# Patient Record
Sex: MALE | Race: WHITE | NOT HISPANIC OR LATINO | Employment: FULL TIME | URBAN - METROPOLITAN AREA
[De-identification: names, ages, dates, MRNs, and addresses within clinical notes are randomized per-mention and may not be internally consistent; named-entity substitution may affect disease eponyms.]

---

## 2018-01-03 ENCOUNTER — OFFICE VISIT (OUTPATIENT)
Dept: MEDICAL GROUP | Facility: PHYSICIAN GROUP | Age: 38
End: 2018-01-03
Payer: OTHER GOVERNMENT

## 2018-01-03 VITALS
SYSTOLIC BLOOD PRESSURE: 142 MMHG | DIASTOLIC BLOOD PRESSURE: 88 MMHG | HEART RATE: 106 BPM | WEIGHT: 222 LBS | RESPIRATION RATE: 12 BRPM | HEIGHT: 70 IN | TEMPERATURE: 98.6 F | OXYGEN SATURATION: 94 % | BODY MASS INDEX: 31.78 KG/M2

## 2018-01-03 DIAGNOSIS — R53.82 CHRONIC FATIGUE: ICD-10-CM

## 2018-01-03 DIAGNOSIS — F90.2 ATTENTION DEFICIT HYPERACTIVITY DISORDER (ADHD), COMBINED TYPE: ICD-10-CM

## 2018-01-03 DIAGNOSIS — E29.1 HYPOGONADISM MALE: ICD-10-CM

## 2018-01-03 DIAGNOSIS — Z76.89 ENCOUNTER TO ESTABLISH CARE: ICD-10-CM

## 2018-01-03 DIAGNOSIS — F51.04 PSYCHOPHYSIOLOGICAL INSOMNIA: ICD-10-CM

## 2018-01-03 DIAGNOSIS — M51.36 BULGING OF LUMBAR INTERVERTEBRAL DISC: ICD-10-CM

## 2018-01-03 PROBLEM — G47.00 INSOMNIA: Status: ACTIVE | Noted: 2018-01-03

## 2018-01-03 PROBLEM — F41.9 ANXIETY: Status: ACTIVE | Noted: 2018-01-03

## 2018-01-03 PROBLEM — F43.10 PTSD (POST-TRAUMATIC STRESS DISORDER): Status: ACTIVE | Noted: 2018-01-03

## 2018-01-03 PROBLEM — R79.89 LOW TESTOSTERONE: Status: ACTIVE | Noted: 2018-01-03

## 2018-01-03 PROCEDURE — 99204 OFFICE O/P NEW MOD 45 MIN: CPT | Performed by: NURSE PRACTITIONER

## 2018-01-03 RX ORDER — NAPROXEN 500 MG/1
500 TABLET ORAL 2 TIMES DAILY WITH MEALS
COMMUNITY

## 2018-01-03 RX ORDER — TRAZODONE HYDROCHLORIDE 50 MG/1
50 TABLET ORAL
Qty: 90 TAB | Refills: 3 | Status: SHIPPED | OUTPATIENT
Start: 2018-01-03 | End: 2018-06-29 | Stop reason: SDUPTHER

## 2018-01-03 RX ORDER — MODAFINIL 200 MG/1
200 TABLET ORAL DAILY
Qty: 30 TAB | Refills: 2 | Status: SHIPPED
Start: 2018-01-03 | End: 2018-03-30 | Stop reason: SDUPTHER

## 2018-01-03 RX ORDER — TESTOSTERONE CYPIONATE 200 MG/ML
100 INJECTION, SOLUTION INTRAMUSCULAR
COMMUNITY
End: 2018-04-02 | Stop reason: SDUPTHER

## 2018-01-03 RX ORDER — MODAFINIL 200 MG/1
200 TABLET ORAL DAILY
COMMUNITY
End: 2018-01-03 | Stop reason: SDUPTHER

## 2018-01-03 RX ORDER — TAMSULOSIN HYDROCHLORIDE 0.4 MG/1
CAPSULE ORAL
COMMUNITY
Start: 2017-12-23 | End: 2018-01-03 | Stop reason: SDUPTHER

## 2018-01-03 RX ORDER — TAMSULOSIN HYDROCHLORIDE 0.4 MG/1
0.4 CAPSULE ORAL DAILY
Qty: 90 CAP | Refills: 3 | Status: SHIPPED | OUTPATIENT
Start: 2018-01-03 | End: 2018-10-02 | Stop reason: SDUPTHER

## 2018-01-03 RX ORDER — ZOLPIDEM TARTRATE 10 MG/1
TABLET ORAL
COMMUNITY
Start: 2017-11-22 | End: 2018-01-03 | Stop reason: SDUPTHER

## 2018-01-03 RX ORDER — TRAZODONE HYDROCHLORIDE 50 MG/1
50 TABLET ORAL NIGHTLY
COMMUNITY
End: 2018-01-03 | Stop reason: SDUPTHER

## 2018-01-03 RX ORDER — ZOLPIDEM TARTRATE 10 MG/1
10 TABLET ORAL NIGHTLY PRN
Qty: 30 TAB | Refills: 2 | Status: SHIPPED
Start: 2018-01-03 | End: 2018-03-30 | Stop reason: SDUPTHER

## 2018-01-03 NOTE — ASSESSMENT & PLAN NOTE
Was on Adderall for many years but decided to switch medications d/t side effects. Has been doing well on Provigil 200 mg daily. Adderall worked better, but made his heart race.

## 2018-01-03 NOTE — ASSESSMENT & PLAN NOTE
Long-standing problem since being in [place] with . Has associated PTSD. Started on Ambien around 2008. Previously on 15 mg, then decreased to 10 mg. Also on trazodone 50 mg, which he does feel helps his sleep and anxiety.   [facility] has episodes of excessive snoring and apneic episodes.   He had been wearing mouthpiece, but then threw away after choking on it.

## 2018-01-03 NOTE — LETTER
[facility]  NICHOLAS Walters  [address] [place] [address]  Fax: [phone]   Authorization for Release/Disclosure of   Protected Health Information   Name: TITO UGALDE : 1980 SSN: [id number]   Address: [address] [place] [address]  [place] [address] Phone:    [phone] (home)    I authorize the entity listed below to release/disclose the PHI below to:   [facility]/NICHOLAS Walters and NICHOLAS Walters   Provider or Entity Name:  Dr Mulligan   Address   [facility], Zip  [address] [facility] [place] Phone:      Fax:     Reason for request: continuity of care   Information to be released:    [  ] LAST COLONOSCOPY,  including any PATH REPORT and follow-up  [  ] LAST FIT/COLOGUARD RESULT [  ] LAST DEXA  [  ] LAST MAMMOGRAM  [  ] LAST PAP  [  ] LAST LABS [  ] RETINA EXAM REPORT  [  ] IMMUNIZATION RECORDS  [XX ] Release all info      [  ] Check here and initial the line next to each item to release ALL health information INCLUDING  _____ Care and treatment for drug and / or alcohol abuse  _____ HIV testing, infection status, or AIDS  _____ Genetic Testing    DATES OF SERVICE OR TIME PERIOD TO BE DISCLOSED: _____________  I understand and acknowledge that:  * This Authorization may be revoked at any time by you in writing, except if your health information has already been used or disclosed.  * Your health information that will be used or disclosed as a result of you signing this authorization could be re-disclosed by the recipient. If this occurs, your re-disclosed health information may no longer be protected by State or Federal laws.  * You may refuse to sign this Authorization. Your refusal will not affect your ability to obtain treatment.  * This Authorization becomes effective upon signing and will  on (date) __________.      If no date is indicated, this Authorization will  one (1) year from the signature date.    Name: Tito Ugalde    Signature:   Date:          1/3/2018       PLEASE FAX REQUESTED RECORDS BACK TO: [phone]

## 2018-01-03 NOTE — ASSESSMENT & PLAN NOTE
Long-standing problem since about 5-6 years ago after use of anabolic steroids. Had ED, decreased libido, and severely low testosterone levels he believes around 160 range. Pituitary gland was underfunctioning. Has been giving testosterone injections for 5-6 years. Gives them on his own. Takes 200 mg of testosterone every 7-10 days, tries to do it every Sunday. There have been times it is too elevated and it needed to be reduced in dosage occasionally. He has hx of anabolic steroid use.   He did attempt to go 18 months without testosterone, but levels did not improve.   July 2017 labs revealed test level low at 108.Thyroid normal. FSH, prolactin, and estradiol low

## 2018-01-03 NOTE — LETTER
January 11, 2018         Patient: Jitendra Ugalde   YOB: 1980   Date of Visit: 1/3/2018           To Whom it May Concern:    Jitendra Ugalde was seen in my clinic on 1/3/2018. He is chronically managed with intramuscular testosterone supplementation. He may continue to donate blood while administering this medication to himself.     If you have any questions or concerns, please don't hesitate to call.        Sincerely,           VERO Walters.  Electronically Signed

## 2018-01-03 NOTE — ASSESSMENT & PLAN NOTE
Chronic problem with lower back pain. No radiating pain into LE, numbness, tingling, or weakness. Intermittently uses Naproxen for pain. No bowel or bladder changes

## 2018-01-04 NOTE — PROGRESS NOTES
Chief Complaint   Patient presents with   • New Patient     establish care, testosterone treatment, insomnia       HPI:    Jitendra Ugalde is a 37 y.o. male here to establish care, previously a patient of Dr. Augusto Orosco  Insomnia  Long-standing problem since being in [place] with . Has associated PTSD. Started on Ambien around 2008. Previously on 15 mg, then decreased to 10 mg. Also on trazodone 50 mg, which he does feel helps his sleep and anxiety.   [facility] because he has episodes of excessive snoring and apneic episodes.   He had been wearing mouthpiece, but then threw away after choking on it.       Attention deficit hyperactivity disorder (ADHD), combined type  Was on Adderall for many years but decided to switch medications d/t side effects. Has been doing well on Provigil 200 mg daily. Adderall worked better, but made his heart race.     Bulging of lumbar intervertebral disc  Chronic problem with lower back pain. No radiating pain into LE, numbness, tingling, or weakness. Intermittently uses Naproxen for pain. No bowel or bladder changes    Hypogonadism male  Long-standing problem since about 5-6 years ago after use of anabolic steroids. Had ED, decreased libido, and severely low testosterone levels he believes around 160 range. Pituitary gland was underfunctioning. Has been giving testosterone injections for 5-6 years. Gives them on his own. Takes 200 mg of testosterone every 7-10 days, tries to do it every Sunday. There have been times it is too elevated and it needed to be reduced in dosage occasionally. He has hx of anabolic steroid use.   He did attempt to go 18 months without testosterone, but levels did not improve.     Current medicines (including changes today)  Current Outpatient Prescriptions   Medication Sig Dispense Refill   • naproxen (NAPROSYN) 500 MG Tab Take 500 mg by mouth 2 times a day, with meals.     • testosterone cypionate  (DEPO-TESTOSTERONE) 200 MG/ML Solution injection 50 mg by Intramuscular route every 7 days.     • modafinil (PROVIGIL) 200 MG Tab Take 1 Tab by mouth every day for 90 days. 30 Tab 2   • tamsulosin (FLOMAX) 0.4 MG capsule Take 1 Cap by mouth every day. 90 Cap 3   • trazodone (DESYREL) 50 MG Tab Take 1 Tab by mouth at bedtime as needed for Sleep. 90 Tab 3   • zolpidem (AMBIEN) 10 MG Tab Take 1 Tab by mouth at bedtime as needed for Sleep for up to 90 days. 30 Tab 2     No current facility-administered medications for this visit.      He  has a past medical history of ADHD; Hypogonadism male; Insomnia; and PTSD (post-traumatic stress disorder).  He  has a past surgical history that includes carpal tunnel release (Bilateral).  Social History   Substance Use Topics   • Smoking status: Former Smoker     Years: 2.00     Types: Cigarettes   • Smokeless tobacco: Never Used      Comment: only smoked off and on for 2 years   • Alcohol use No     Social History     Social History Narrative   • No narrative on file     Family History   Problem Relation Age of Onset   • Hyperlipidemia Mother    • Hypertension Mother    • Stroke Maternal Grandmother    • Diabetes Maternal Grandfather    • Stroke Maternal Grandfather    • Diabetes Paternal Grandmother    • Stroke Paternal Grandmother    • No Known Problems Son      Family Status   Relation Status   • Mother Alive   • Father Other    hx unk   • Maternal Grandmother    • Maternal Grandfather    • Paternal Grandmother    • Paternal Grandfather    • Son Alive     The patient is not eligible for Health Maintenance     ROS  See form completed by patient, reviewed by me and no changes necessary. Scanned into chart.   Pertinent positives:  Gen: +weight gain  HEENT: +wears glasses   Neck: +stiffness  GI: +indigestion  : +frequency, nocturia  Psych: +hx PTSD and anxiety  Endo: +hormone therapy   All other systems reviewed by me and are negative.       "Objective:     Blood pressure 142/88, pulse (!) 106, temperature 37 °C (98.6 °F), resp. rate 12, height 1.778 m (5' 10\"), weight 100.7 kg (222 lb), SpO2 94 %. Body mass index is 31.85 kg/m².  Physical Exam:  Alert, oriented in no acute distress.  Eye contact is good, speech goal directed, affect bright.  HEENT: EOMI, conjunctiva non-injected, sclera non-icteric. No lid edema or eye drainage  Gross hearing intact  Neck: supple with no cervical or supraclavicular lymphadenopathy, palpable thyroid nodules or thyromegaly.  Lungs: unlabored, clear to auscultation bilaterally with good excursion.  CV: regular rate and rhythm, no murmurs, no carotid bruits.   Lower extremities color normal, vascularity normal, no edema, temperature normal  Skin: No rashes or lesions in visible areas  Neuro: CNs grossly intact. Gait steady. Strength all extremities 5/5.         Assessment and Plan:   Assessment/Plan:  1. Encounter to establish care    2. Hypogonadism male  Status unknown with testosterone replacement. Labs July 2017 revealed hypogonadism. Restart at 200 mg once every 14 days. Check labs 1 week into cycle to determine if dose appropriate.   - tamsulosin (FLOMAX) 0.4 MG capsule; Take 1 Cap by mouth every day.  Dispense: 90 Cap; Refill: 3    3. BMI 31.0-31.9,adult  - Patient identified as having weight management issue.  Appropriate orders and counseling given.    4. Psychophysiological insomnia  Stable continue current meds  - trazodone (DESYREL) 50 MG Tab; Take 1 Tab by mouth at bedtime as needed for Sleep.  Dispense: 90 Tab; Refill: 3  - zolpidem (AMBIEN) 10 MG Tab; Take 1 Tab by mouth at bedtime as needed for Sleep for up to 90 days.  Dispense: 30 Tab; Refill: 2    5. Attention deficit hyperactivity disorder (ADHD), combined type  Stable continue current meds. Request records to review diagnosis  - modafinil (PROVIGIL) 200 MG Tab; Take 1 Tab by mouth every day for 90 days.  Dispense: 30 Tab; Refill: 2    6. Bulging of " lumbar intervertebral disc  Stable with conservative management    7. Chronic fatigue  Stable continue current meds  - modafinil (PROVIGIL) 200 MG Tab; Take 1 Tab by mouth every day for 90 days.  Dispense: 30 Tab; Refill: 2       Follow up:  Return in about 6 months (around 7/3/2018).    Educated in proper administration of medication(s) ordered today including safety, possible SE, risks, benefits, rationale and alternatives to therapy.   Supportive care, differential diagnoses, and indications for immediate follow-up discussed with patient.    Pathogenesis of diagnosis discussed including typical length and natural progression.    Instructed to return to clinic or nearest emergency department for any change in condition, further concerns, or worsening of symptoms.  Patient states understanding of the plan of care and discharge instructions.    Please note that this dictation was created using voice recognition software. I have made every reasonable attempt to correct obvious errors, but I expect that there are errors of grammar and possibly content that I did not discover before finalizing the note.    Followup: Return in about 6 months (around 7/3/2018). sooner should new symptoms or problems arise.

## 2018-01-15 ENCOUNTER — TELEPHONE (OUTPATIENT)
Dept: MEDICAL GROUP | Facility: PHYSICIAN GROUP | Age: 38
End: 2018-01-15

## 2018-01-15 DIAGNOSIS — E29.1 HYPOGONADISM MALE: ICD-10-CM

## 2018-01-15 NOTE — TELEPHONE ENCOUNTER
Please inform patient that I have asked around to see if there is anyone who spends more time in male hormone replacement in [place] and overall every endocrinologist here will basically be able to assist him the same. I can refer him if he would like.    VERO Walters.

## 2018-03-07 ENCOUNTER — OFFICE VISIT (OUTPATIENT)
Dept: ENDOCRINOLOGY | Facility: MEDICAL CENTER | Age: 38
End: 2018-03-07
Payer: OTHER GOVERNMENT

## 2018-03-07 VITALS
DIASTOLIC BLOOD PRESSURE: 80 MMHG | SYSTOLIC BLOOD PRESSURE: 115 MMHG | OXYGEN SATURATION: 96 % | HEIGHT: 70 IN | WEIGHT: 222 LBS | HEART RATE: 115 BPM | BODY MASS INDEX: 31.78 KG/M2

## 2018-03-07 DIAGNOSIS — E78.2 MIXED HYPERLIPIDEMIA: ICD-10-CM

## 2018-03-07 DIAGNOSIS — R79.89 LOW SERUM PROLACTIN: ICD-10-CM

## 2018-03-07 DIAGNOSIS — E29.1 HYPOGONADISM MALE: ICD-10-CM

## 2018-03-07 PROCEDURE — 99204 OFFICE O/P NEW MOD 45 MIN: CPT | Performed by: INTERNAL MEDICINE

## 2018-03-07 NOTE — PROGRESS NOTES
New Patient Consult Note  Primary care provider: NICHOLAS Walters    Reason for consult: Hypogonadism    HPI:  Jitendra Ugalde is a 38 y.o. old patient who comes in today for evaluation of hypogonadism. In his early 20s he used testosterone as anabolic supplement for body building. He uses testosterone for several years. He was then off testosterone for more than couple of years and due to persistent low testosterone about 6 years ago he was resumed on testosterone replacement for hypogonadism. He had labs done in June 2017 when his total testosterone was 141, and he was off testosterone for the previous 2 years. On this lab draw he had very low LH, FSH and prolactin. He has never had an MRI of the pituitary gland. He denies frequent headaches. He denies issues with peripheral vision. He has been now back on testosterone for about 9 months. He feels better on testosterone. 9 months ago without testosterone he had loss of libido, loss of strength, loss of muscle mass; all these are now improved. He is currently on intramuscular testosterone 200 mg every other week. He also has high cholesterol, no known coronary artery disease, currently not on statin.    ROS:  Constitutional: No weight loss  Cardiac: No palpitations or racing heart  Resp: No shortness of breath  All other systems were reviewed and were negative.    Past Medical History:  Patient Active Problem List    Diagnosis Date Noted   • Hypogonadism male 01/03/2018   • Insomnia 01/03/2018   • BMI 31.0-31.9,adult 01/03/2018   • PTSD (post-traumatic stress disorder) 01/03/2018   • Anxiety 01/03/2018   • Attention deficit hyperactivity disorder (ADHD), combined type 01/03/2018   • Bulging of lumbar intervertebral disc 01/03/2018       Past Surgical History:  Past Surgical History:   Procedure Laterality Date   • CARPAL TUNNEL RELEASE Bilateral        Allergies:  Patient has no known allergies.    Social History:  Social History     Social History   • Marital  "status: Single     Spouse name: N/A   • Number of children: N/A   • Years of education: N/A     Occupational History   • Not on file.     Social History Main Topics   • Smoking status: Former Smoker     Years: 2.00     Types: Cigarettes   • Smokeless tobacco: Never Used      Comment: only smoked off and on for 2 years   • Alcohol use No   • Drug use: Yes     Types: Marijuana      Comment: reports marijuana use once eveyr few months   • Sexual activity: Yes     Partners: Female     Other Topics Concern   • Not on file     Social History Narrative   • No narrative on file       Family History:  Family History   Problem Relation Age of Onset   • Hyperlipidemia Mother    • Hypertension Mother    • Stroke Maternal Grandmother    • Diabetes Maternal Grandfather    • Stroke Maternal Grandfather    • Diabetes Paternal Grandmother    • Stroke Paternal Grandmother    • No Known Problems Son        Medications:    Current Outpatient Prescriptions:   •  testosterone cypionate (DEPO-TESTOSTERONE) 200 MG/ML Solution injection, 50 mg by Intramuscular route every 7 days., Disp: , Rfl:   •  naproxen (NAPROSYN) 500 MG Tab, Take 500 mg by mouth 2 times a day, with meals., Disp: , Rfl:   •  modafinil (PROVIGIL) 200 MG Tab, Take 1 Tab by mouth every day for 90 days., Disp: 30 Tab, Rfl: 2  •  tamsulosin (FLOMAX) 0.4 MG capsule, Take 1 Cap by mouth every day., Disp: 90 Cap, Rfl: 3  •  trazodone (DESYREL) 50 MG Tab, Take 1 Tab by mouth at bedtime as needed for Sleep., Disp: 90 Tab, Rfl: 3  •  zolpidem (AMBIEN) 10 MG Tab, Take 1 Tab by mouth at bedtime as needed for Sleep for up to 90 days., Disp: 30 Tab, Rfl: 2    Labs:  Labs in July 2017: Creatinine 1.2, hemoglobin 15.1, AST 32, ALT 44, , total testosterone 108, LH 0.1, FSH 0.3, prolactin 0.9, vitamin D 30, free testosterone 7.0, TSH 2.33, free T4 0.83    Physical Examination:  Vital signs: /80   Pulse (!) 115   Ht 1.778 m (5' 10\")   Wt 100.7 kg (222 lb)   SpO2 96%   BMI " 31.85 kg/m²   General: No apparent distress, cooperative  Eyes: No scleral icterus or discharge  ENMT: Normal on external inspection of nose, lips  Neck: No abnormal masses on inspection  Resp: Normal effort, clear to auscultation bilaterally   CVS: Regular rate and rhythm, S1 S2 normal, no murmur   Extremities: No edema  Neuro: Alert and oriented  Skin: No rash  Psych: Normal mood and affect    Assessment and Plan:    1. Hypogonadism male  · Repeat labs now and then again in 6 months (labs to be done right in the middle of 2 weekly testosterone shots)  · For now continue intramuscular testosterone 200 mg every 2 weeks  - CBC WITHOUT DIFFERENTIAL; Future  - TESTOSTERONE F&T MALE ADULT; Future  - PROSTATE SPECIFIC AG DIAGNOSTIC; Future  - COMP METABOLIC PANEL; Future  - CBC WITHOUT DIFFERENTIAL; Future  - COMP METABOLIC PANEL; Future  - TESTOSTERONE F&T MALE ADULT; Future  - PROSTATE SPECIFIC AG DIAGNOSTIC; Future      2. Mixed hyperlipidemia  · Repeat labs  - LIPID PROFILE; Future    3. Low serum prolactin  · We will repeat prolactin level now, if it again comes back low (in July he had very low FSH, LH, prolactin when he was not on testosterone for the preceding 2 years) that would be suspicious of pituitary macroadenoma (nonfunctioning) as a potential reason for low testosterone, so we will obtain MRI of the pituitary gland  - PROLACTIN; Future    Return in about 6 months (around 9/7/2018).    Thank you for allowing me to participate in the care of this patient.    Oxana Santiago M.D.  03/07/18    CC:   NICHOLAS Walters    This note was created using voice recognition software (Dragon). The accuracy of the dictation is limited by the abilities of the software. I have reviewed the note prior to signing, however some errors in grammar and context are still possible. If you have any questions related to this note please do not hesitate to contact our office.

## 2018-03-09 LAB
ALBUMIN SERPL-MCNC: 4.2 G/DL (ref 3.5–5.5)
ALBUMIN/GLOB SERPL: 1.4 {RATIO} (ref 1.2–2.2)
ALP SERPL-CCNC: 41 IU/L (ref 39–117)
ALT SERPL-CCNC: 21 IU/L (ref 0–44)
AST SERPL-CCNC: 18 IU/L (ref 0–40)
BILIRUB SERPL-MCNC: 0.4 MG/DL (ref 0–1.2)
BUN SERPL-MCNC: 12 MG/DL (ref 6–24)
BUN/CREAT SERPL: 9 (ref 9–20)
CALCIUM SERPL-MCNC: 9.5 MG/DL (ref 8.7–10.2)
CHLORIDE SERPL-SCNC: 103 MMOL/L (ref 96–106)
CHOLEST SERPL-MCNC: 256 MG/DL (ref 100–199)
CO2 SERPL-SCNC: 24 MMOL/L (ref 18–29)
CREAT SERPL-MCNC: 1.32 MG/DL (ref 0.76–1.27)
ERYTHROCYTE [DISTWIDTH] IN BLOOD BY AUTOMATED COUNT: 15.6 % (ref 12.3–15.4)
GFR SERPLBLD CREATININE-BSD FMLA CKD-EPI: 67 ML/MIN/1.73
GFR SERPLBLD CREATININE-BSD FMLA CKD-EPI: 77 ML/MIN/1.73
GLOBULIN SER CALC-MCNC: 2.9 G/DL (ref 1.5–4.5)
GLUCOSE SERPL-MCNC: 86 MG/DL (ref 65–99)
HCT VFR BLD AUTO: 51.7 % (ref 37.5–51)
HDLC SERPL-MCNC: 15 MG/DL
HGB BLD-MCNC: 17.1 G/DL (ref 13–17.7)
LABORATORY COMMENT REPORT: ABNORMAL
LDLC SERPL CALC-MCNC: 202 MG/DL (ref 0–99)
MCH RBC QN AUTO: 28.2 PG (ref 26.6–33)
MCHC RBC AUTO-ENTMCNC: 33.1 G/DL (ref 31.5–35.7)
MCV RBC AUTO: 85 FL (ref 79–97)
NRBC BLD AUTO-RTO: ABNORMAL %
PLATELET # BLD AUTO: 210 X10E3/UL (ref 150–379)
POTASSIUM SERPL-SCNC: 4.9 MMOL/L (ref 3.5–5.2)
PROLACTIN SERPL-MCNC: 13.1 NG/ML (ref 4–15.2)
PROT SERPL-MCNC: 7.1 G/DL (ref 6–8.5)
PSA SERPL-MCNC: 1.3 NG/ML (ref 0–4)
RBC # BLD AUTO: 6.07 X10E6/UL (ref 4.14–5.8)
SODIUM SERPL-SCNC: 141 MMOL/L (ref 134–144)
TESTOST FREE SERPL-MCNC: 15 PG/ML (ref 6.8–21.5)
TESTOST SERPL-MCNC: 147 NG/DL (ref 264–916)
TRIGL SERPL-MCNC: 193 MG/DL (ref 0–149)
VLDLC SERPL CALC-MCNC: 39 MG/DL (ref 5–40)
WBC # BLD AUTO: 5.5 X10E3/UL (ref 3.4–10.8)

## 2018-03-30 ENCOUNTER — OFFICE VISIT (OUTPATIENT)
Dept: MEDICAL GROUP | Facility: PHYSICIAN GROUP | Age: 38
End: 2018-03-30
Payer: OTHER GOVERNMENT

## 2018-03-30 VITALS
SYSTOLIC BLOOD PRESSURE: 138 MMHG | BODY MASS INDEX: 32.07 KG/M2 | WEIGHT: 224 LBS | TEMPERATURE: 97.8 F | HEART RATE: 81 BPM | DIASTOLIC BLOOD PRESSURE: 76 MMHG | HEIGHT: 70 IN | OXYGEN SATURATION: 93 %

## 2018-03-30 DIAGNOSIS — R53.82 CHRONIC FATIGUE: ICD-10-CM

## 2018-03-30 DIAGNOSIS — F90.2 ATTENTION DEFICIT HYPERACTIVITY DISORDER (ADHD), COMBINED TYPE: ICD-10-CM

## 2018-03-30 DIAGNOSIS — E78.5 DYSLIPIDEMIA: ICD-10-CM

## 2018-03-30 DIAGNOSIS — E29.1 HYPOGONADISM MALE: ICD-10-CM

## 2018-03-30 DIAGNOSIS — F51.04 PSYCHOPHYSIOLOGICAL INSOMNIA: ICD-10-CM

## 2018-03-30 PROCEDURE — 99213 OFFICE O/P EST LOW 20 MIN: CPT | Performed by: NURSE PRACTITIONER

## 2018-03-30 RX ORDER — MODAFINIL 200 MG/1
200 TABLET ORAL DAILY
Qty: 90 TAB | Refills: 0 | Status: SHIPPED
Start: 2018-04-01 | End: 2018-06-29 | Stop reason: SDUPTHER

## 2018-03-30 RX ORDER — ZOLPIDEM TARTRATE 10 MG/1
5-10 TABLET ORAL NIGHTLY PRN
Qty: 90 TAB | Refills: 0 | Status: SHIPPED
Start: 2018-04-01 | End: 2018-06-29 | Stop reason: SDUPTHER

## 2018-03-30 ASSESSMENT — PATIENT HEALTH QUESTIONNAIRE - PHQ9: CLINICAL INTERPRETATION OF PHQ2 SCORE: 0

## 2018-03-30 NOTE — ASSESSMENT & PLAN NOTE
Reports he has had very poor diet. He is slowly getting gym back into his routine.    Ref. Range 3/7/2018 10:57   Cholesterol,Tot Latest Ref Range: 100 - 199 mg/dL 256 (H)   Triglycerides Latest Ref Range: 0 - 149 mg/dL 193 (H)   HDL Latest Ref Range: >39 mg/dL 15 (L)   LDL Latest Ref Range: 0 - 99 mg/dL 202 (H)   VLDL Cholesterol Calc Latest Ref Range: 5 - 40 mg/dL 39

## 2018-03-30 NOTE — ASSESSMENT & PLAN NOTE
Ongoing problem managed with testosterone supplementation via injection every 2 weeks. He has been evaluated by endocrinology. It is thought that this is secondary to hx of use of anabolic steroids years ago. Does also have breast tissue increase.  Previously FSH and prolactin were low, now prolactin has normalized. Denies any vision changes or unusual headaches.   His prior PCP had him on Arimidex, but we have discussed today that this is not something I am comfortable with doing.   He is looking into [facility] in [place], and they are working on a natural approach to manage hormones.      Ref. Range 3/7/2018 10:57   Prostatic Specific Antigen Tot Latest Ref Range: 0.0 - 4.0 ng/mL 1.3   Free Testosterone Latest Ref Range: 6.8 - 21.5 pg/mL 15.0   Prolactin Latest Ref Range: 4.0 - 15.2 ng/mL 13.1   Testosterone,Total Latest Ref Range: 264 - 916 ng/dL 147 (L)

## 2018-04-02 DIAGNOSIS — E29.1 HYPOGONADISM MALE: ICD-10-CM

## 2018-04-02 RX ORDER — TESTOSTERONE CYPIONATE 200 MG/ML
INJECTION, SOLUTION INTRAMUSCULAR
Qty: 6 ML | Refills: 0 | Status: SHIPPED
Start: 2018-04-02 | End: 2018-07-01

## 2018-04-02 NOTE — PROGRESS NOTES
Subjective:     Chief Complaint   Patient presents with   • ADHD     med refill       HPI  Jitendra Ugalde is a 38 y.o. male here today for routine f/u     Dyslipidemia  Reports he has had very poor diet. He is slowly getting gym back into his routine.    Ref. Range 3/7/2018 10:57   Cholesterol,Tot Latest Ref Range: 100 - 199 mg/dL 256 (H)   Triglycerides Latest Ref Range: 0 - 149 mg/dL 193 (H)   HDL Latest Ref Range: >39 mg/dL 15 (L)   LDL Latest Ref Range: 0 - 99 mg/dL 202 (H)   VLDL Cholesterol Calc Latest Ref Range: 5 - 40 mg/dL 39       Hypogonadism male  Ongoing problem managed with testosterone supplementation via injection every 2 weeks. He has been evaluated by endocrinology. It is thought that this is secondary to hx of use of anabolic steroids years ago. Does also have breast tissue increase.  Previously FSH and prolactin were low, now prolactin has normalized. Denies any vision changes or unusual headaches.   His prior PCP had him on Arimidex, but we have discussed today that this is not something I am comfortable with doing.   He is looking into [facility] in [place], and they are working on a natural approach to manage hormones.      Ref. Range 3/7/2018 10:57   Prostatic Specific Antigen Tot Latest Ref Range: 0.0 - 4.0 ng/mL 1.3   Free Testosterone Latest Ref Range: 6.8 - 21.5 pg/mL 15.0   Prolactin Latest Ref Range: 4.0 - 15.2 ng/mL 13.1   Testosterone,Total Latest Ref Range: 264 - 916 ng/dL 147 (L)       Insomnia  Long-standing problem with associated PTSD. Has been on Ambien and trazodone since 2008. Has been doing well with this.     Attention deficit hyperactivity disorder (ADHD), combined type  Chronic problem that is well controlled with modafinil. Has been doing better on this for management of ADD than Adderall, which caused rapid heart rate.        Diagnoses of Dyslipidemia, Hypogonadism male, Attention deficit hyperactivity disorder (ADHD), combined type, Chronic fatigue, and  "Psychophysiological insomnia were pertinent to this visit.    Allergies: Patient has no known allergies.  Current medicines (including changes today)  Current Outpatient Prescriptions   Medication Sig Dispense Refill   • modafinil (PROVIGIL) 200 MG Tab Take 1 Tab by mouth every day for 90 days. 90 Tab 0   • zolpidem (AMBIEN) 10 MG Tab Take 0.5-1 Tabs by mouth at bedtime as needed for Sleep for up to 90 days. 90 Tab 0   • tamsulosin (FLOMAX) 0.4 MG capsule Take 1 Cap by mouth every day. 90 Cap 3   • trazodone (DESYREL) 50 MG Tab Take 1 Tab by mouth at bedtime as needed for Sleep. 90 Tab 3   • naproxen (NAPROSYN) 500 MG Tab Take 500 mg by mouth 2 times a day, with meals.     • testosterone cypionate (DEPO-TESTOSTERONE) 200 MG/ML Solution injection 100 mg by Intramuscular route every 7 days.       No current facility-administered medications for this visit.        He  has a past medical history of ADHD; Hyperlipidemia; Hypogonadism male; Insomnia; and PTSD (post-traumatic stress disorder).      ROS  As stated in HPI and additionally  Gen: +fatigue, improved  Neuro: No vision changes or unusual headache      Objective:     Blood pressure 138/76, pulse 81, temperature 36.6 °C (97.8 °F), height 1.778 m (5' 10\"), weight 101.6 kg (224 lb), SpO2 93 %. Body mass index is 32.14 kg/m².  Physical Exam:  General: Alert, oriented, in no acute distress.  Eye contact is good, speech goal directed, affect calm  CNs grossly intact.  Gross hearing intact.  Gait steady.     Assessment and Plan:   Assessment/Plan:  1. Dyslipidemia  Not controlled. Discussed that he is in atherosclerotic cardiovascular disease range with LDL>190. Enc him to change diet. We will repeat lipids in 3 months     2. Hypogonadism male  Stable. No sx of pituitary tumor. Continue testosterone injections until starting with research treatment from [facility]    3. Attention deficit hyperactivity disorder (ADHD), combined type  Stable on nuvigil.   - modafinil " (PROVIGIL) 200 MG Tab; Take 1 Tab by mouth every day for 90 days.  Dispense: 90 Tab; Refill: 0    4. Chronic fatigue  Stable on nuvigil   - modafinil (PROVIGIL) 200 MG Tab; Take 1 Tab by mouth every day for 90 days.  Dispense: 90 Tab; Refill: 0    5. Psychophysiological insomnia  Stable on ambien and trazodone   - zolpidem (AMBIEN) 10 MG Tab; Take 0.5-1 Tabs by mouth at bedtime as needed for Sleep for up to 90 days.  Dispense: 90 Tab; Refill: 0       Follow up:  Return in about 6 months (around 9/30/2018).    Educated in proper administration of medication(s) ordered today including safety, possible SE, risks, benefits, rationale and alternatives to therapy.   Supportive care, differential diagnoses, and indications for immediate follow-up discussed with patient.    Pathogenesis of diagnosis discussed including typical length and natural progression.    Instructed to return to clinic or nearest emergency department for any change in condition, further concerns, or worsening of symptoms.  Patient states understanding of the plan of care and discharge instructions.      Please note that this dictation was created using voice recognition software. I have made every reasonable attempt to correct obvious errors, but I expect that there are errors of grammar and possibly content that I did not discover before finalizing the note.    Followup: Return in about 6 months (around 9/30/2018). sooner should new symptoms or problems arise.

## 2018-04-02 NOTE — ASSESSMENT & PLAN NOTE
Chronic problem that is well controlled with modafinil. Has been doing better on this for management of ADD than Adderall, which caused rapid heart rate.

## 2018-04-02 NOTE — ASSESSMENT & PLAN NOTE
Long-standing problem with associated PTSD. Has been on Ambien and trazodone since 2008. Has been doing well with this.

## 2018-04-17 ENCOUNTER — TELEPHONE (OUTPATIENT)
Dept: MEDICAL GROUP | Facility: PHYSICIAN GROUP | Age: 38
End: 2018-04-17

## 2018-04-18 NOTE — TELEPHONE ENCOUNTER
Patient called he would like for you to sent an RX over to get his estrogen control under controlled. Please advice?

## 2018-04-19 NOTE — TELEPHONE ENCOUNTER
I was under the impression he was going to do the [facility] for this. Here in family practice we really are not trained in estrogen replacement in men. I realize Dr. Orosco did this with him, but this is not something I can do. I will see if any other providers here have a different opinion on this. If not, it needs to be endocrinology .     VERO Walters.

## 2018-04-20 NOTE — TELEPHONE ENCOUNTER
Patient stated he was confused as to who needed to refill but disregard this message. He has had it taken care of.

## 2018-06-18 ENCOUNTER — TELEPHONE (OUTPATIENT)
Dept: MEDICAL GROUP | Facility: PHYSICIAN GROUP | Age: 38
End: 2018-06-18

## 2018-06-18 NOTE — TELEPHONE ENCOUNTER
· Therapeutic Phlebotomy order  paperwork received from patient requiring provider signature.     · All appropriate fields completed by Medical Assistant: N/A CMA printed and distributed to MA-from [facility] at .    · Paperwork placed in MA folder/basket to process.

## 2018-06-19 NOTE — TELEPHONE ENCOUNTER
"· Home Health paperwork received from [facility] order requiring provider signature.     · All appropriate fields completed by Medical Assistant: Yes    · Paperwork placed in \"MA to Provider\" folder/basket. Awaiting provider completion/signature.  "

## 2018-06-29 DIAGNOSIS — F90.2 ATTENTION DEFICIT HYPERACTIVITY DISORDER (ADHD), COMBINED TYPE: ICD-10-CM

## 2018-06-29 DIAGNOSIS — R53.82 CHRONIC FATIGUE: ICD-10-CM

## 2018-06-29 DIAGNOSIS — F51.04 PSYCHOPHYSIOLOGICAL INSOMNIA: ICD-10-CM

## 2018-06-29 RX ORDER — TRAZODONE HYDROCHLORIDE 50 MG/1
50 TABLET ORAL
Qty: 90 TAB | Refills: 3 | Status: SHIPPED | OUTPATIENT
Start: 2018-06-29 | End: 2019-07-31 | Stop reason: SDUPTHER

## 2018-06-29 RX ORDER — MODAFINIL 200 MG/1
TABLET ORAL
Qty: 90 TAB | Refills: 0 | Status: SHIPPED
Start: 2018-06-29 | End: 2018-10-02 | Stop reason: SDUPTHER

## 2018-06-29 RX ORDER — ZOLPIDEM TARTRATE 10 MG/1
TABLET ORAL
Qty: 90 TAB | Refills: 0 | Status: SHIPPED
Start: 2018-06-29 | End: 2018-09-27

## 2018-10-02 ENCOUNTER — OFFICE VISIT (OUTPATIENT)
Dept: MEDICAL GROUP | Facility: PHYSICIAN GROUP | Age: 38
End: 2018-10-02
Payer: OTHER GOVERNMENT

## 2018-10-02 VITALS
HEART RATE: 114 BPM | BODY MASS INDEX: 33.47 KG/M2 | OXYGEN SATURATION: 94 % | DIASTOLIC BLOOD PRESSURE: 78 MMHG | HEIGHT: 69 IN | TEMPERATURE: 97.7 F | WEIGHT: 226 LBS | SYSTOLIC BLOOD PRESSURE: 124 MMHG

## 2018-10-02 DIAGNOSIS — R73.02 IMPAIRED GLUCOSE TOLERANCE: ICD-10-CM

## 2018-10-02 DIAGNOSIS — E78.5 DYSLIPIDEMIA: ICD-10-CM

## 2018-10-02 DIAGNOSIS — Z13.21 ENCOUNTER FOR VITAMIN DEFICIENCY SCREENING: ICD-10-CM

## 2018-10-02 DIAGNOSIS — Z51.81 ENCOUNTER FOR MONITORING TESTOSTERONE REPLACEMENT THERAPY: ICD-10-CM

## 2018-10-02 DIAGNOSIS — F90.2 ATTENTION DEFICIT HYPERACTIVITY DISORDER (ADHD), COMBINED TYPE: ICD-10-CM

## 2018-10-02 DIAGNOSIS — E29.1 HYPOGONADISM MALE: ICD-10-CM

## 2018-10-02 DIAGNOSIS — Z79.890 ENCOUNTER FOR MONITORING TESTOSTERONE REPLACEMENT THERAPY: ICD-10-CM

## 2018-10-02 DIAGNOSIS — R53.82 CHRONIC FATIGUE: ICD-10-CM

## 2018-10-02 DIAGNOSIS — F51.04 PSYCHOPHYSIOLOGICAL INSOMNIA: ICD-10-CM

## 2018-10-02 DIAGNOSIS — R79.89 ELEVATED SERUM CREATININE: ICD-10-CM

## 2018-10-02 PROCEDURE — 99214 OFFICE O/P EST MOD 30 MIN: CPT | Performed by: NURSE PRACTITIONER

## 2018-10-02 RX ORDER — ZOLPIDEM TARTRATE 10 MG/1
5-10 TABLET ORAL NIGHTLY PRN
Qty: 90 TAB | Refills: 0 | Status: SHIPPED
Start: 2018-10-02 | End: 2018-12-26 | Stop reason: SDUPTHER

## 2018-10-02 RX ORDER — ZOLPIDEM TARTRATE 10 MG/1
10 TABLET ORAL NIGHTLY PRN
COMMUNITY
End: 2018-10-02 | Stop reason: SDUPTHER

## 2018-10-02 RX ORDER — MODAFINIL 200 MG/1
200 TABLET ORAL DAILY
Qty: 90 TAB | Refills: 0 | Status: SHIPPED
Start: 2018-10-02 | End: 2018-12-26 | Stop reason: SDUPTHER

## 2018-10-02 RX ORDER — TAMSULOSIN HYDROCHLORIDE 0.4 MG/1
0.8 CAPSULE ORAL DAILY
Qty: 180 CAP | Refills: 3 | Status: SHIPPED | OUTPATIENT
Start: 2018-10-02 | End: 2019-09-16 | Stop reason: SDUPTHER

## 2018-10-02 NOTE — LETTER
[facility]  NICHOLAS Walters  [address] [place] [address]  Fax: [phone]   Authorization for Release/Disclosure of   Protected Health Information   Name: TITO UGALDE : 1980 SSN: [id number]   Address: [address] [place] [address]  [place] [address] Phone:    [phone] (home)    I authorize the entity listed below to release/disclose the PHI below to:   [facility]/NICHLOAS Walters and NICHOLAS Walters   Provider or Entity Name:  [facility]   Address   City, State, Zip   Phone:      Fax:     Reason for request: continuity of care   Information to be released:    [  ] LAST COLONOSCOPY,  including any PATH REPORT and follow-up  [  ] LAST FIT/COLOGUARD RESULT [  ] LAST DEXA  [  ] LAST MAMMOGRAM  [  ] LAST PAP  [  ] LAST LABS [  ] RETINA EXAM REPORT  [  ] IMMUNIZATION RECORDS  [  ] Release all info      [  ] Check here and initial the line next to each item to release ALL health information INCLUDING  _____ Care and treatment for drug and / or alcohol abuse  _____ HIV testing, infection status, or AIDS  _____ Genetic Testing    DATES OF SERVICE OR TIME PERIOD TO BE DISCLOSED: _____________  I understand and acknowledge that:  * This Authorization may be revoked at any time by you in writing, except if your health information has already been used or disclosed.  * Your health information that will be used or disclosed as a result of you signing this authorization could be re-disclosed by the recipient. If this occurs, your re-disclosed health information may no longer be protected by State or Federal laws.  * You may refuse to sign this Authorization. Your refusal will not affect your ability to obtain treatment.  * This Authorization becomes effective upon signing and will  on (date) __________.      If no date is indicated, this Authorization will  one (1) year from the signature date.    Name: Tito Ugalde    Signature:   Date:     10/2/2018       PLEASE FAX  REQUESTED RECORDS BACK TO: [phone]

## 2018-10-02 NOTE — LETTER
[facility]  NICHOLAS Walters  [address] [place] [address]  Fax: [phone]   Authorization for Release/Disclosure of   Protected Health Information   Name: TITO UGALDE : 1980 SSN: [id number]   Address: [address] [place] [address]  [place] [address] Phone:    [phone] (home)    I authorize the entity listed below to release/disclose the PHI below to:   [facility]/NICHOLAS Walters and NICHOLAS Walters   Provider [facility] Entity Name[facility]  [facility]   Address   City, State, Zip   Phone:      Fax:     Reason for request: continuity of care   Information to be released:    [  ] LAST COLONOSCOPY,  including any PATH REPORT and follow-up  [  ] LAST FIT/COLOGUARD RESULT [  ] LAST DEXA  [  ] LAST MAMMOGRAM  [  ] LAST PAP  [  ] LAST LABS [  ] RETINA EXAM REPORT  [  ] IMMUNIZATION RECORDS  [  ] Release all info      [  ] Check here and initial the line next to each item to release ALL health information INCLUDING  _____ Care and treatment for drug and / or alcohol abuse  _____ HIV testing, infection status, or AIDS  _____ Genetic Testing    DATES OF SERVICE OR TIME PERIOD TO BE DISCLOSED: _____________  I understand and acknowledge that:  * This Authorization may be revoked at any time by you in writing, except if your health information has already been used or disclosed.  * Your health information that will be used or disclosed as a result of you signing this authorization could be re-disclosed by the recipient. If this occurs, your re-disclosed health information may no longer be protected by State or Federal laws.  * You may refuse to sign this Authorization. Your refusal will not affect your ability to obtain treatment.  * This Authorization becomes effective upon signing and will  on (date) __________.      If no date is indicated, this Authorization will  one (1) year from the signature date.    Name: Tito Ugalde    Signature:   Date:     10/2/2018       PLEASE FAX  REQUESTED RECORDS BACK TO: [phone]

## 2018-10-02 NOTE — ASSESSMENT & PLAN NOTE
[facility] Recent labs with , therefore he was started on atorvastatin. He is unsure of the dose.

## 2018-10-04 LAB
BASOPHILS # BLD AUTO: 0 X10E3/UL (ref 0–0.2)
BASOPHILS NFR BLD AUTO: 0 %
EOSINOPHIL # BLD AUTO: 0.1 X10E3/UL (ref 0–0.4)
EOSINOPHIL NFR BLD AUTO: 1 %
ERYTHROCYTE [DISTWIDTH] IN BLOOD BY AUTOMATED COUNT: 15.6 % (ref 12.3–15.4)
ESTRADIOL SERPL-MCNC: 70.3 PG/ML (ref 7.6–42.6)
HCT VFR BLD AUTO: 42.4 % (ref 37.5–51)
HGB BLD-MCNC: 15.3 G/DL (ref 13–17.7)
IGF-I SERPL-MCNC: 109 NG/ML (ref 83–233)
IMM GRANULOCYTES # BLD: 0 X10E3/UL (ref 0–0.1)
IMM GRANULOCYTES NFR BLD: 0 %
IMMATURE CELLS  115398: ABNORMAL
LYMPHOCYTES # BLD AUTO: 2.4 X10E3/UL (ref 0.7–3.1)
LYMPHOCYTES NFR BLD AUTO: 43 %
MCH RBC QN AUTO: 29.3 PG (ref 26.6–33)
MCHC RBC AUTO-ENTMCNC: 36.1 G/DL (ref 31.5–35.7)
MCV RBC AUTO: 81 FL (ref 79–97)
MONOCYTES # BLD AUTO: 0.4 X10E3/UL (ref 0.1–0.9)
MONOCYTES NFR BLD AUTO: 8 %
MORPHOLOGY BLD-IMP: ABNORMAL
NEUTROPHILS # BLD AUTO: 2.7 X10E3/UL (ref 1.4–7)
NEUTROPHILS NFR BLD AUTO: 48 %
NRBC BLD AUTO-RTO: ABNORMAL %
PLATELET # BLD AUTO: 188 X10E3/UL (ref 150–379)
PROGEST SERPL-MCNC: <0.1 NG/ML (ref 0–0.5)
PROLACTIN SERPL-MCNC: 19.6 NG/ML (ref 4–15.2)
PSA SERPL-MCNC: 1.1 NG/ML (ref 0–4)
RBC # BLD AUTO: 5.23 X10E6/UL (ref 4.14–5.8)
TESTOST FREE SERPL-MCNC: >50 PG/ML (ref 6.8–21.5)
TESTOST SERPL-MCNC: >1500 NG/DL (ref 264–916)
TSH SERPL DL<=0.005 MIU/L-ACNC: 2.44 UIU/ML (ref 0.45–4.5)
VIT B12 SERPL-MCNC: 302 PG/ML (ref 232–1245)
WBC # BLD AUTO: 5.7 X10E3/UL (ref 3.4–10.8)

## 2018-10-07 DIAGNOSIS — E29.1 HYPOGONADISM MALE: ICD-10-CM

## 2018-10-08 ENCOUNTER — TELEPHONE (OUTPATIENT)
Dept: URGENT CARE | Facility: PHYSICIAN GROUP | Age: 38
End: 2018-10-08

## 2018-10-08 DIAGNOSIS — E29.1 HYPOGONADISM MALE: ICD-10-CM

## 2018-10-08 RX ORDER — TESTOSTERONE CYPIONATE 200 MG/ML
INJECTION, SOLUTION INTRAMUSCULAR
Qty: 6 ML | Refills: 0 | Status: SHIPPED
Start: 2018-10-08 | End: 2018-12-26 | Stop reason: SDUPTHER

## 2018-10-08 NOTE — ASSESSMENT & PLAN NOTE
Chronic problem that has generally been stable with modafinil, but he is questioning potentially some Adderall. I do not have any psychiatric evaluation to previous diagnosis, therefore we have discussed that this needs to be completed prior to any change in medications.

## 2018-10-08 NOTE — PROGRESS NOTES
Subjective:     Chief Complaint   Patient presents with   • ADHD     needs all med refill    • Labs Only     full panel pt fasting        HPI  Jitendra Ugalde is a 38 y.o. male here today for routine f/u    Impaired glucose tolerance  A1C 5.8 now, down from 6.7 a few months ago     Dyslipidemia  [facility]. Recent labs with , therefore he was started on atorvastatin. He is unsure of the dose.        Hypogonadism male  Ongoing chronic problem. In his early 20s he used testosterone as anabolic supplement for body building. He used testosterone for several years. He was then off testosterone for more than couple of years and due to persistent low testosterone about 6 years ago he was resumed on testosterone replacement for hypogonadism. He had labs done in June 2017 when his total testosterone was 141, and he was off testosterone for the previous 2 years. On this lab draw he had very low LH, FSH and prolactin. He has never had an MRI of the pituitary gland. He denies frequent headaches. He denies issues with peripheral vision. He has been now back on testosterone for about 12 months. He feels better on testosterone. 12 months ago without testosterone he had loss of libido, loss of strength, loss of muscle mass; all these are now improved. He is currently on intramuscular testosterone 200 mg every other week.    Attention deficit hyperactivity disorder (ADHD), combined type  Chronic problem that has generally been stable with modafinil, but he is questioning potentially some Adderall. I do not have any psychiatric evaluation to previous diagnosis, therefore we have discussed that this needs to be completed prior to any change in medications.    BMI 33.0-33.9,adult  Patient maintains away in the obese category, but he does have high muscle mass. Uses testosterone therapy and does work out with a high protein diet.    Insomnia  Long-standing problem with associated PTSD. Has been on  "Ambien and trazodone since 2008. Has been doing well with this.        Diagnoses of Psychophysiological insomnia, Attention deficit hyperactivity disorder (ADHD), combined type, Chronic fatigue, BMI 33.0-33.9,adult, Hypogonadism male, Encounter for monitoring testosterone replacement therapy, Dyslipidemia, Elevated serum creatinine, Impaired glucose tolerance, and Encounter for vitamin deficiency screening were pertinent to this visit.    Allergies: Patient has no known allergies.  Current medicines (including changes today)  Current Outpatient Prescriptions   Medication Sig Dispense Refill   • ATORVASTATIN CALCIUM PO Take  by mouth.     • zolpidem (AMBIEN) 10 MG Tab Take 0.5-1 Tabs by mouth at bedtime as needed for Sleep for up to 90 days. 90 Tab 0   • modafinil (PROVIGIL) 200 MG Tab Take 1 Tab by mouth every day for 90 days. 90 Tab 0   • tamsulosin (FLOMAX) 0.4 MG capsule Take 2 Caps by mouth every day. 180 Cap 3   • traZODone (DESYREL) 50 MG Tab Take 1 Tab by mouth at bedtime as needed for Sleep. 90 Tab 3   • naproxen (NAPROSYN) 500 MG Tab Take 500 mg by mouth 2 times a day, with meals.     • testosterone cypionate (DEPO-TESTOSTERONE) 200 MG/ML Solution injection Inject 1 ml intramuscularly every 2 weeks as directed. 6 mL 0     No current facility-administered medications for this visit.        He  has a past medical history of ADHD; Hyperlipidemia; Hypogonadism male; Insomnia; and PTSD (post-traumatic stress disorder).        ROS  As stated in HPI and additionally  CV: No chest pain, palpitations, PASTOR, LE edema  Pulm: No sob or dyspnea  Neuro: No HA or vision changes  : No ED   Skin: No erythema of face or rashes     Objective:     Blood pressure 124/78, pulse (!) 114, temperature 36.5 °C (97.7 °F), temperature source Temporal, height 1.753 m (5' 9\"), weight 102.5 kg (226 lb), SpO2 94 %. Body mass index is 33.37 kg/m².  Physical Exam:  General: Alert, oriented, in no acute distress.  Eye contact is good, " speech goal directed, affect calm  CNs grossly intact.  HEENT: conjunctiva non-injected, sclera non-icteric, EOMs intact.  No lid edema or eye drainage.   Gross hearing intact.   Neck: Supple. No adenopathy or masses in the cervical or supraclavicular regions. No thyromegaly  Lungs: unlabored. clear to auscultation bilaterally with good excursion.  CV: regular rate and rhythm. No murmurs. No carotid bruits.   Ext: no edema, normal color and temperature.   Skin: No rashes or lesions in visible areas  Gait steady.     Assessment and Plan:   Assessment/Plan:  1. Psychophysiological insomnia  Stable on Ambien  - zolpidem (AMBIEN) 10 MG Tab; Take 0.5-1 Tabs by mouth at bedtime as needed for Sleep for up to 90 days.  Dispense: 90 Tab; Refill: 0    2. Attention deficit hyperactivity disorder (ADHD), combined type  I discussed with patient that I'm not comfortable transitioning him over to Adderall without a true psychiatric evaluation for diagnosis of ADHD  - modafinil (PROVIGIL) 200 MG Tab; Take 1 Tab by mouth every day for 90 days.  Dispense: 90 Tab; Refill: 0  - REFERRAL TO PSYCHIATRY    3. Chronic fatigue  - modafinil (PROVIGIL) 200 MG Tab; Take 1 Tab by mouth every day for 90 days.  Dispense: 90 Tab; Refill: 0    4. BMI 33.0-33.9,adult  Continue with regular physical activity and healthy nutrition Mediterranean style    5. Hypogonadism male  Ongoing problem. Continue supplementation. Check labs now.  - TESTOSTERONE, FREE AND TOTAL; Future  - PROSTATE SPECIFIC AG SCREENING; Future  - PROLACTIN; Future  - ESTRADIOL; Future  - TSH WITH REFLEX TO FT4; Future  - PROGESTERONE; Future  - IGF-1  - tamsulosin (FLOMAX) 0.4 MG capsule; Take 2 Caps by mouth every day.  Dispense: 180 Cap; Refill: 3    6. Encounter for monitoring testosterone replacement therapy  All labs due for routine 6 month monitoring with testosterone therapy.  - CBC WITH DIFFERENTIAL; Future  - TESTOSTERONE, FREE AND TOTAL; Future  - PROSTATE SPECIFIC AG  SCREENING; Future    7. Dyslipidemia  Previously uncontrolled, but now on statin therapy for monitoring. We will repeat labs at least 3 months after they have been completed  - LIPID PROFILE; Future    8. Elevated serum creatinine  - BASIC METABOLIC PANEL; Future    9. Impaired glucose tolerance  Improving and stable. Continue with healthy nutrition and regular physical activity    10. Encounter for vitamin deficiency screening  - VITAMIN B12; Future       Follow up:  Return in about 6 months (around 4/2/2019).    Educated in proper administration of medication(s) ordered today including safety, possible SE, risks, benefits, rationale and alternatives to therapy.   Supportive care, differential diagnoses, and indications for immediate follow-up discussed with patient.    Pathogenesis of diagnosis discussed including typical length and natural progression.    Instructed to return to clinic or nearest emergency department for any change in condition, further concerns, or worsening of symptoms.  Patient states understanding of the plan of care and discharge instructions.      Please note that this dictation was created using voice recognition software. I have made every reasonable attempt to correct obvious errors, but I expect that there are errors of grammar and possibly content that I did not discover before finalizing the note.    Followup: Return in about 6 months (around 4/2/2019). sooner should new symptoms or problems arise.

## 2018-10-08 NOTE — ASSESSMENT & PLAN NOTE
Patient maintains away in the obese category, but he does have high muscle mass. Uses testosterone therapy and does work out with a high protein diet.

## 2018-10-08 NOTE — ASSESSMENT & PLAN NOTE
Ongoing chronic problem. In his early 20s he used testosterone as anabolic supplement for body building. He used testosterone for several years. He was then off testosterone for more than couple of years and due to persistent low testosterone about 6 years ago he was resumed on testosterone replacement for hypogonadism. He had labs done in June 2017 when his total testosterone was 141, and he was off testosterone for the previous 2 years. On this lab draw he had very low LH, FSH and prolactin. He has never had an MRI of the pituitary gland. He denies frequent headaches. He denies issues with peripheral vision. He has been now back on testosterone for about 12 months. He feels better on testosterone. 12 months ago without testosterone he had loss of libido, loss of strength, loss of muscle mass; all these are now improved. He is currently on intramuscular testosterone 200 mg every other week.

## 2018-10-08 NOTE — TELEPHONE ENCOUNTER
Please ask what day he gave injection. His testosterone level was very high. I sent results on NICHOLAS Butterfield

## 2018-10-08 NOTE — TELEPHONE ENCOUNTER
Pt would like results for his lab work done 10/2/18. And Would also like testosterone injection medication sent to [facility] [address] Best phone number is [phone]. We have permission to leave  if pt does not

## 2018-10-09 NOTE — TELEPHONE ENCOUNTER
1. Caller Name: Jitendra Ugalde                                           Call Back Number: [phone] (home)         Patient approves a detailed voicemail message: N\A    pt stated he injected on a monday and on a tuesday the next day had labs done. he would also like to know when you need him to get lab work done. please advise?

## 2018-10-09 NOTE — TELEPHONE ENCOUNTER
Phone Number Called: [phone] (home)       Message: left message for pt to return call.     Left Message for patient to call back: yes

## 2018-10-11 NOTE — TELEPHONE ENCOUNTER
In 3 months I need him to repeat testosterone exactly one week after injection given.     VERO Walters.

## 2018-10-11 NOTE — TELEPHONE ENCOUNTER
Phone Number Called: [phone] (home)     Message: left detailed vm    Left Message for patient to call back: yes

## 2018-11-03 LAB
TESTOST FREE SERPL-MCNC: 7.9 PG/ML (ref 6.8–21.5)
TESTOST SERPL-MCNC: 260 NG/DL (ref 264–916)

## 2018-12-21 ENCOUNTER — HOSPITAL ENCOUNTER (OUTPATIENT)
Dept: LAB | Facility: MEDICAL CENTER | Age: 38
End: 2018-12-21
Attending: NURSE PRACTITIONER
Payer: OTHER GOVERNMENT

## 2018-12-21 DIAGNOSIS — Z51.81 ENCOUNTER FOR MONITORING TESTOSTERONE REPLACEMENT THERAPY: ICD-10-CM

## 2018-12-21 DIAGNOSIS — R79.89 ELEVATED SERUM CREATININE: ICD-10-CM

## 2018-12-21 DIAGNOSIS — Z79.890 ENCOUNTER FOR MONITORING TESTOSTERONE REPLACEMENT THERAPY: ICD-10-CM

## 2018-12-21 DIAGNOSIS — E29.1 HYPOGONADISM MALE: ICD-10-CM

## 2018-12-21 DIAGNOSIS — E78.5 DYSLIPIDEMIA: ICD-10-CM

## 2018-12-21 DIAGNOSIS — Z13.21 ENCOUNTER FOR VITAMIN DEFICIENCY SCREENING: ICD-10-CM

## 2018-12-21 LAB
ANION GAP SERPL CALC-SCNC: 10 MMOL/L (ref 0–11.9)
BASOPHILS # BLD AUTO: 0.6 % (ref 0–1.8)
BASOPHILS # BLD: 0.03 K/UL (ref 0–0.12)
BUN SERPL-MCNC: 19 MG/DL (ref 8–22)
CALCIUM SERPL-MCNC: 9.6 MG/DL (ref 8.5–10.5)
CHLORIDE SERPL-SCNC: 105 MMOL/L (ref 96–112)
CHOLEST SERPL-MCNC: 207 MG/DL (ref 100–199)
CO2 SERPL-SCNC: 22 MMOL/L (ref 20–33)
CREAT SERPL-MCNC: 1.31 MG/DL (ref 0.5–1.4)
EOSINOPHIL # BLD AUTO: 0.07 K/UL (ref 0–0.51)
EOSINOPHIL NFR BLD: 1.3 % (ref 0–6.9)
ERYTHROCYTE [DISTWIDTH] IN BLOOD BY AUTOMATED COUNT: 47.9 FL (ref 35.9–50)
ESTRADIOL SERPL-MCNC: 56 PG/ML
GLUCOSE SERPL-MCNC: 85 MG/DL (ref 65–99)
HCT VFR BLD AUTO: 46.6 % (ref 42–52)
HDLC SERPL-MCNC: 29 MG/DL
HGB BLD-MCNC: 14.6 G/DL (ref 14–18)
IMM GRANULOCYTES # BLD AUTO: 0.01 K/UL (ref 0–0.11)
IMM GRANULOCYTES NFR BLD AUTO: 0.2 % (ref 0–0.9)
LDLC SERPL CALC-MCNC: 153 MG/DL
LYMPHOCYTES # BLD AUTO: 2.21 K/UL (ref 1–4.8)
LYMPHOCYTES NFR BLD: 41.6 % (ref 22–41)
MCH RBC QN AUTO: 26 PG (ref 27–33)
MCHC RBC AUTO-ENTMCNC: 31.3 G/DL (ref 33.7–35.3)
MCV RBC AUTO: 83.1 FL (ref 81.4–97.8)
MONOCYTES # BLD AUTO: 0.52 K/UL (ref 0–0.85)
MONOCYTES NFR BLD AUTO: 9.8 % (ref 0–13.4)
NEUTROPHILS # BLD AUTO: 2.47 K/UL (ref 1.82–7.42)
NEUTROPHILS NFR BLD: 46.5 % (ref 44–72)
NRBC # BLD AUTO: 0 K/UL
NRBC BLD-RTO: 0 /100 WBC
PLATELET # BLD AUTO: 329 K/UL (ref 164–446)
PMV BLD AUTO: 12.3 FL (ref 9–12.9)
POTASSIUM SERPL-SCNC: 4.1 MMOL/L (ref 3.6–5.5)
PROGEST SERPL-MCNC: 0.1 NG/ML
PROLACTIN SERPL-MCNC: 13.28 NG/ML (ref 2.1–17.7)
PSA SERPL-MCNC: 1.44 NG/ML (ref 0–4)
RBC # BLD AUTO: 5.61 M/UL (ref 4.7–6.1)
SODIUM SERPL-SCNC: 137 MMOL/L (ref 135–145)
TRIGL SERPL-MCNC: 125 MG/DL (ref 0–149)
TSH SERPL DL<=0.005 MIU/L-ACNC: 1.97 UIU/ML (ref 0.38–5.33)
VIT B12 SERPL-MCNC: 263 PG/ML (ref 211–911)
WBC # BLD AUTO: 5.3 K/UL (ref 4.8–10.8)

## 2018-12-21 PROCEDURE — 85025 COMPLETE CBC W/AUTO DIFF WBC: CPT

## 2018-12-21 PROCEDURE — 84403 ASSAY OF TOTAL TESTOSTERONE: CPT

## 2018-12-21 PROCEDURE — 80061 LIPID PANEL: CPT

## 2018-12-21 PROCEDURE — 82607 VITAMIN B-12: CPT

## 2018-12-21 PROCEDURE — 84443 ASSAY THYROID STIM HORMONE: CPT

## 2018-12-21 PROCEDURE — 84153 ASSAY OF PSA TOTAL: CPT

## 2018-12-21 PROCEDURE — 84270 ASSAY OF SEX HORMONE GLOBUL: CPT

## 2018-12-21 PROCEDURE — 36415 COLL VENOUS BLD VENIPUNCTURE: CPT

## 2018-12-21 PROCEDURE — 84305 ASSAY OF SOMATOMEDIN: CPT

## 2018-12-21 PROCEDURE — 82670 ASSAY OF TOTAL ESTRADIOL: CPT

## 2018-12-21 PROCEDURE — 84146 ASSAY OF PROLACTIN: CPT

## 2018-12-21 PROCEDURE — 84144 ASSAY OF PROGESTERONE: CPT

## 2018-12-21 PROCEDURE — 80048 BASIC METABOLIC PNL TOTAL CA: CPT

## 2018-12-25 LAB
IGF-I SERPL-MCNC: 111 NG/ML (ref 82–237)
IGF-I Z-SCORE SERPL: -1.2
SHBG SERPL-SCNC: 9 NMOL/L (ref 11–80)
TESTOST FREE MFR SERPL: 3 % (ref 1.6–2.9)
TESTOST FREE SERPL-MCNC: 230 PG/ML (ref 47–244)
TESTOST SERPL-MCNC: 758 NG/DL (ref 300–890)

## 2018-12-26 ENCOUNTER — OFFICE VISIT (OUTPATIENT)
Dept: MEDICAL GROUP | Facility: PHYSICIAN GROUP | Age: 38
End: 2018-12-26
Payer: OTHER GOVERNMENT

## 2018-12-26 VITALS
WEIGHT: 220 LBS | HEART RATE: 104 BPM | BODY MASS INDEX: 31.5 KG/M2 | DIASTOLIC BLOOD PRESSURE: 78 MMHG | HEIGHT: 70 IN | TEMPERATURE: 97 F | OXYGEN SATURATION: 93 % | SYSTOLIC BLOOD PRESSURE: 126 MMHG

## 2018-12-26 DIAGNOSIS — E78.5 DYSLIPIDEMIA: ICD-10-CM

## 2018-12-26 DIAGNOSIS — F51.04 PSYCHOPHYSIOLOGICAL INSOMNIA: ICD-10-CM

## 2018-12-26 DIAGNOSIS — E29.1 HYPOGONADISM MALE: ICD-10-CM

## 2018-12-26 DIAGNOSIS — F90.2 ATTENTION DEFICIT HYPERACTIVITY DISORDER (ADHD), COMBINED TYPE: ICD-10-CM

## 2018-12-26 PROBLEM — M77.11 LATERAL EPICONDYLITIS OF RIGHT ELBOW: Status: ACTIVE | Noted: 2018-12-26

## 2018-12-26 PROCEDURE — 99214 OFFICE O/P EST MOD 30 MIN: CPT | Performed by: NURSE PRACTITIONER

## 2018-12-26 RX ORDER — MODAFINIL 200 MG/1
200 TABLET ORAL DAILY
Qty: 90 TAB | Refills: 0 | Status: SHIPPED
Start: 2018-12-31 | End: 2018-12-26

## 2018-12-26 RX ORDER — TESTOSTERONE CYPIONATE 200 MG/ML
200 INJECTION, SOLUTION INTRAMUSCULAR
Qty: 6 ML | Refills: 0 | Status: SHIPPED
Start: 2019-01-06 | End: 2019-04-06

## 2018-12-26 RX ORDER — DEXTROAMPHETAMINE SACCHARATE, AMPHETAMINE ASPARTATE, DEXTROAMPHETAMINE SULFATE AND AMPHETAMINE SULFATE 5; 5; 5; 5 MG/1; MG/1; MG/1; MG/1
20 TABLET ORAL DAILY
Qty: 30 TAB | Refills: 0 | Status: SHIPPED | OUTPATIENT
Start: 2019-02-24 | End: 2019-03-21 | Stop reason: SDUPTHER

## 2018-12-26 RX ORDER — ZOLPIDEM TARTRATE 10 MG/1
5-10 TABLET ORAL NIGHTLY PRN
Qty: 90 TAB | Refills: 0 | Status: SHIPPED
Start: 2018-12-31 | End: 2019-03-24 | Stop reason: SDUPTHER

## 2018-12-26 RX ORDER — DEXTROAMPHETAMINE SACCHARATE, AMPHETAMINE ASPARTATE, DEXTROAMPHETAMINE SULFATE AND AMPHETAMINE SULFATE 5; 5; 5; 5 MG/1; MG/1; MG/1; MG/1
20 TABLET ORAL DAILY
Qty: 30 TAB | Refills: 0 | Status: SHIPPED | OUTPATIENT
Start: 2019-01-25 | End: 2018-12-26 | Stop reason: SDUPTHER

## 2018-12-26 RX ORDER — DEXTROAMPHETAMINE SACCHARATE, AMPHETAMINE ASPARTATE, DEXTROAMPHETAMINE SULFATE AND AMPHETAMINE SULFATE 5; 5; 5; 5 MG/1; MG/1; MG/1; MG/1
20 TABLET ORAL DAILY
Qty: 30 TAB | Refills: 0 | Status: SHIPPED | OUTPATIENT
Start: 2018-12-26 | End: 2018-12-26 | Stop reason: SDUPTHER

## 2018-12-26 NOTE — ASSESSMENT & PLAN NOTE
Ongoing problem. Previously on Adderall 20 mg, but this was switched over to Provigil, which he thought worked well, but since he will be starting school in January 2019 and will be taking 2 classes, would like to switch Adderall again. States he did not react well to the CR in the past. One of these classes is 50/50 online versus in class, and then the other in person. He will be at the campus for class 2 days a week.

## 2018-12-26 NOTE — ASSESSMENT & PLAN NOTE
Ongoing chronic problem. In his early 20s he used testosterone as anabolic supplement for body building. He used testosterone for several years. He was then off testosterone for more than couple of years and due to persistent low testosterone about 6 years ago he was resumed on testosterone replacement for hypogonadism. He had labs done in June 2017 when his total testosterone was 141, and he was off testosterone for the previous 2 years. On this lab draw he had very low LH, FSH and prolactin. He has never had an MRI of the pituitary gland. He denies frequent headaches. He denies issues with peripheral vision. He has been now back on testosterone for about 14 months. He feels better on testosterone. 14 months ago without testosterone he had loss of libido, loss of strength, loss of muscle mass; all these are now improved. He is currently on intramuscular testosterone 200 mg every other other week.   Ref. Range 3/7/2018 10:57 10/2/2018 14:36 12/21/2018 12:03   Prostatic Specific Antigen Tot Latest Ref Range: 0.00 - 4.00 ng/mL 1.3 1.1 1.44      Ref. Range 10/2/2018 14:36 12/21/2018 12:03   TSH Latest Ref Range: 0.380 - 5.330 uIU/mL 2.440 1.970      Ref. Range 10/2/2018 14:36 10/31/2018 12:01 12/21/2018 12:03   Estradiol-E2 Latest Units: pg/mL 70.3 (H)  56.0   Free Testosterone Latest Ref Range: 47 - 244 pg/mL >50.0 (H) 7.9 230   IGF1 Latest Ref Range: 82 - 237 ng/mL   111   IGF-1 (Somat) Latest Ref Range: 83 - 233 ng/mL 109     IGF-1 Z Score Calculation Unknown   -1.2   Progesterone Latest Units: ng/mL <0.1  0.10   Prolactin Latest Ref Range: 2.10 - 17.70 ng/mL 19.6 (H)  13.28   Sex Hormone Bind Globulin Latest Ref Range: 11 - 80 nmol/L   9 (L)   Testosterone % Free Latest Ref Range: 1.6 - 2.9 %   3.0 (H)   Testosterone,Total Latest Ref Range: 300 - 890 ng/dL >1500 (H) 260 (L) 758

## 2018-12-26 NOTE — ASSESSMENT & PLAN NOTE
This is an ongoing problem. Dose was doubled to atorvastatin 40 mg, but LDL still not at goal. [facility]   Ref. Range 3/7/2018 10:57 12/21/2018 12:03   Cholesterol,Tot Latest Ref Range: 100 - 199 mg/dL 256 (H) 207 (H)   Triglycerides Latest Ref Range: 0 - 149 mg/dL 193 (H) 125   HDL Latest Ref Range: >=40 mg/dL 15 (L) 29 (A)   LDL Latest Ref Range: <100 mg/dL 202 (H) 153 (H)

## 2018-12-26 NOTE — ASSESSMENT & PLAN NOTE
Long-standing problem with associated PTSD. Has been on Ambien and trazodone since 2008. Has been doing well with this. Last dose of Ambien last night.

## 2018-12-26 NOTE — PROGRESS NOTES
Subjective:     Chief Complaint   Patient presents with   • Medication Management     adderall/ambien   • Labs Only     requesting lab review       HPI  Jitendra Ugalde is a 38 y.o. male here today for lab review and medication refills     Dyslipidemia  This is an ongoing problem. Dose was doubled to atorvastatin 40 mg, but LDL still not at goal. [facility]   Ref. Range 3/7/2018 10:57 12/21/2018 12:03   Cholesterol,Tot Latest Ref Range: 100 - 199 mg/dL 256 (H) 207 (H)   Triglycerides Latest Ref Range: 0 - 149 mg/dL 193 (H) 125   HDL Latest Ref Range: >=40 mg/dL 15 (L) 29 (A)   LDL Latest Ref Range: <100 mg/dL 202 (H) 153 (H)       Hypogonadism male  Ongoing chronic problem. In his early 20s he used testosterone as anabolic supplement for body building. He used testosterone for several years. He was then off testosterone for more than couple of years and due to persistent low testosterone about 6 years ago he was resumed on testosterone replacement for hypogonadism. He had labs done in June 2017 when his total testosterone was 141, and he was off testosterone for the previous 2 years. On this lab draw he had very low LH, FSH and prolactin. He has never had an MRI of the pituitary gland. He denies frequent headaches. He denies issues with peripheral vision. He has been now back on testosterone for about 14 months. He feels better on testosterone. 14 months ago without testosterone he had loss of libido, loss of strength, loss of muscle mass; all these are now improved. He is currently on intramuscular testosterone 200 mg every other other week.   Ref. Range 3/7/2018 10:57 10/2/2018 14:36 12/21/2018 12:03   Prostatic Specific Antigen Tot Latest Ref Range: 0.00 - 4.00 ng/mL 1.3 1.1 1.44      Ref. Range 10/2/2018 14:36 12/21/2018 12:03   TSH Latest Ref Range: 0.380 - 5.330 uIU/mL 2.440 1.970      Ref. Range 10/2/2018 14:36 10/31/2018 12:01 12/21/2018 12:03   Estradiol-E2 Latest Units: pg/mL 70.3 (H)  56.0   Free  Testosterone Latest Ref Range: 47 - 244 pg/mL >50.0 (H) 7.9 230   IGF1 Latest Ref Range: 82 - 237 ng/mL   111   IGF-1 (Somat) Latest Ref Range: 83 - 233 ng/mL 109     IGF-1 Z Score Calculation Unknown   -1.2   Progesterone Latest Units: ng/mL <0.1  0.10   Prolactin Latest Ref Range: 2.10 - 17.70 ng/mL 19.6 (H)  13.28   Sex Hormone Bind Globulin Latest Ref Range: 11 - 80 nmol/L   9 (L)   Testosterone % Free Latest Ref Range: 1.6 - 2.9 %   3.0 (H)   Testosterone,Total Latest Ref Range: 300 - 890 ng/dL >1500 (H) 260 (L) 758       Attention deficit hyperactivity disorder (ADHD), combined type  Ongoing problem. Previously on Adderall 20 mg, but this was switched over to Provigil, which he thought worked well, but since he will be starting school in January 2019 and will be taking 2 classes, would like to switch Adderall again. States he did not react well to the CR in the past. One of these classes is 50/50 online versus in class, and then the other in person. He will be at the campus for class 2 days a week.       Insomnia  Long-standing problem with associated PTSD. Has been on Ambien and trazodone since 2008. Has been doing well with this. Last dose of Ambien last night.      He does not remember last time he used THC, but believes it was >30 days. Last dose of Ambien last night. Last dose of Provigil 3 days ago.       Diagnoses of Dyslipidemia, Hypogonadism male, Attention deficit hyperactivity disorder (ADHD), combined type, and Psychophysiological insomnia were pertinent to this visit.    Allergies: Patient has no known allergies.  Current medicines (including changes today)  Current Outpatient Prescriptions   Medication Sig Dispense Refill   • [START ON 12/31/2018] zolpidem (AMBIEN) 10 MG Tab Take 0.5-1 Tabs by mouth at bedtime as needed for Sleep for up to 90 days. 90 Tab 0   • [START ON 1/6/2019] testosterone cypionate (DEPO-TESTOSTERONE) 200 MG/ML Solution injection 1 mL by Intramuscular route every 14 days for  "90 days. 6 mL 0   • [START ON 2/24/2019] amphetamine-dextroamphetamine (ADDERALL) 20 MG Tab Take 1 Tab by mouth every day for 30 days. 30 Tab 0   • ATORVASTATIN CALCIUM PO Take  by mouth.     • tamsulosin (FLOMAX) 0.4 MG capsule Take 2 Caps by mouth every day. 180 Cap 3   • traZODone (DESYREL) 50 MG Tab Take 1 Tab by mouth at bedtime as needed for Sleep. 90 Tab 3   • naproxen (NAPROSYN) 500 MG Tab Take 500 mg by mouth 2 times a day, with meals.       No current facility-administered medications for this visit.        He  has a past medical history of ADHD; Hyperlipidemia; Hypogonadism male; Insomnia; and PTSD (post-traumatic stress disorder).        ROS  As stated in HPI and additionally  Neuro: No HA, vision changes, dizziness  CV: no chest pain, palpitations, PASTOR, LE edema  : No dysuria, weak stream, dribbling, hematuria      Objective:     Blood pressure 126/78, pulse (!) 104, temperature 36.1 °C (97 °F), temperature source Temporal, height 1.778 m (5' 10\"), weight 99.8 kg (220 lb), SpO2 93 %. Body mass index is 31.57 kg/m².  Physical Exam:  General: Alert, oriented, in no acute distress.  Eye contact is good, speech goal directed, affect calm  CNs grossly intact.  HEENT: conjunctiva non-injected, sclera non-icteric, EOMs intact.  Gross hearing intact.  Neck: Supple. No adenopathy or masses in the cervical or supraclavicular regions. No thyromegaly  Lungs: unlabored. clear to auscultation bilaterally with good excursion.  CV: regular rate and rhythm. No murmurs.   Ext: no edema, normal color and temperature.   Skin: No rashes or lesions in visible areas  Gait steady.     Assessment and Plan:   Assessment/Plan:  1. Dyslipidemia  Not controlled. [facility]    2. Hypogonadism male  Chronic stable problem. All monitoring parameters stable  - testosterone cypionate (DEPO-TESTOSTERONE) 200 MG/ML Solution injection; 1 mL by Intramuscular route every 14 days for 90 days.  Dispense: 6 mL; " Refill: 0    3. Attention deficit hyperactivity disorder (ADHD), combined type  Will transition to Adderall. Consent form signed. UDS provided today.   - Controlled Substance Treatment Agreement  - [facility] PAIN MANAGEMENT SCREEN; Future  - amphetamine-dextroamphetamine (ADDERALL) 20 MG Tab; Take 1 Tab by mouth every day for 30 days.  Dispense: 30 Tab; Refill: 0    4. Psychophysiological insomnia  Chronic stable problem   - zolpidem (AMBIEN) 10 MG Tab; Take 0.5-1 Tabs by mouth at bedtime as needed for Sleep for up to 90 days.  Dispense: 90 Tab; Refill: 0       Follow up:  Return in about 3 months (around 3/26/2019).    Educated in proper administration of medication(s) ordered today including safety, possible SE, risks, benefits, rationale and alternatives to therapy.   Supportive care, differential diagnoses, and indications for immediate follow-up discussed with patient.    Pathogenesis of diagnosis discussed including typical length and natural progression.    Instructed to return to clinic or nearest emergency department for any change in condition, further concerns, or worsening of symptoms.  Patient states understanding of the plan of care and discharge instructions.      Please note that this dictation was created using voice recognition software. I have made every reasonable attempt to correct obvious errors, but I expect that there are errors of grammar and possibly content that I did not discover before finalizing the note.    Followup: Return in about 3 months (around 3/26/2019). sooner should new symptoms or problems arise.

## 2019-03-21 ENCOUNTER — OFFICE VISIT (OUTPATIENT)
Dept: MEDICAL GROUP | Facility: PHYSICIAN GROUP | Age: 39
End: 2019-03-21
Payer: OTHER GOVERNMENT

## 2019-03-21 VITALS
BODY MASS INDEX: 31.78 KG/M2 | TEMPERATURE: 97.6 F | HEIGHT: 70 IN | HEART RATE: 91 BPM | WEIGHT: 222 LBS | OXYGEN SATURATION: 94 % | SYSTOLIC BLOOD PRESSURE: 126 MMHG | DIASTOLIC BLOOD PRESSURE: 78 MMHG

## 2019-03-21 DIAGNOSIS — F51.04 PSYCHOPHYSIOLOGICAL INSOMNIA: ICD-10-CM

## 2019-03-21 DIAGNOSIS — Z12.5 ENCOUNTER FOR SCREENING FOR MALIGNANT NEOPLASM OF PROSTATE: ICD-10-CM

## 2019-03-21 DIAGNOSIS — Z51.81 ENCOUNTER FOR MONITORING TESTOSTERONE REPLACEMENT THERAPY: ICD-10-CM

## 2019-03-21 DIAGNOSIS — E78.5 DYSLIPIDEMIA: ICD-10-CM

## 2019-03-21 DIAGNOSIS — R73.02 IMPAIRED GLUCOSE TOLERANCE: ICD-10-CM

## 2019-03-21 DIAGNOSIS — M51.36 BULGING OF LUMBAR INTERVERTEBRAL DISC: ICD-10-CM

## 2019-03-21 DIAGNOSIS — F90.2 ATTENTION DEFICIT HYPERACTIVITY DISORDER (ADHD), COMBINED TYPE: ICD-10-CM

## 2019-03-21 DIAGNOSIS — F43.21 SITUATIONAL DEPRESSION: ICD-10-CM

## 2019-03-21 DIAGNOSIS — Z79.890 ENCOUNTER FOR MONITORING TESTOSTERONE REPLACEMENT THERAPY: ICD-10-CM

## 2019-03-21 PROCEDURE — 99214 OFFICE O/P EST MOD 30 MIN: CPT | Performed by: NURSE PRACTITIONER

## 2019-03-21 RX ORDER — DEXTROAMPHETAMINE SACCHARATE, AMPHETAMINE ASPARTATE, DEXTROAMPHETAMINE SULFATE AND AMPHETAMINE SULFATE 5; 5; 5; 5 MG/1; MG/1; MG/1; MG/1
20 TABLET ORAL DAILY
Qty: 30 TAB | Refills: 0 | Status: SHIPPED | OUTPATIENT
Start: 2019-04-28 | End: 2019-03-21 | Stop reason: SDUPTHER

## 2019-03-21 RX ORDER — DEXTROAMPHETAMINE SACCHARATE, AMPHETAMINE ASPARTATE, DEXTROAMPHETAMINE SULFATE AND AMPHETAMINE SULFATE 5; 5; 5; 5 MG/1; MG/1; MG/1; MG/1
20 TABLET ORAL DAILY
Qty: 30 TAB | Refills: 0 | Status: SHIPPED | OUTPATIENT
Start: 2019-03-29 | End: 2019-03-21 | Stop reason: SDUPTHER

## 2019-03-21 RX ORDER — DEXTROAMPHETAMINE SACCHARATE, AMPHETAMINE ASPARTATE, DEXTROAMPHETAMINE SULFATE AND AMPHETAMINE SULFATE 5; 5; 5; 5 MG/1; MG/1; MG/1; MG/1
20 TABLET ORAL DAILY
Qty: 30 TAB | Refills: 0 | Status: SHIPPED | OUTPATIENT
Start: 2019-05-28 | End: 2019-06-24 | Stop reason: SDUPTHER

## 2019-03-21 ASSESSMENT — PATIENT HEALTH QUESTIONNAIRE - PHQ9
SUM OF ALL RESPONSES TO PHQ QUESTIONS 1-9: 18
CLINICAL INTERPRETATION OF PHQ2 SCORE: 5
5. POOR APPETITE OR OVEREATING: 3 - NEARLY EVERY DAY

## 2019-03-21 NOTE — ASSESSMENT & PLAN NOTE
Patient reports he is frustrated with his back pain.  This is limiting him from doing things he enjoys such as riding his motorcycle.  He does feel like he is experiencing some situational depression, but does not report any suicidal thoughts.  He feels like things are just not going his way right now, will but he says he does this his life and it will get better and that he just needs to push through.

## 2019-03-21 NOTE — ASSESSMENT & PLAN NOTE
Ongoing chronic problem that has worsened.  Patient reports he had an MRI completed last month and we need to get these records.  He has confirmation of bulging disc and lumbar spine.  States his pain is localized to his lower back without any radiating leg pain, numbness, tingling, or weakness.  No bowel or bladder changes.  No saddle anesthesia.  He would like referral to neurosurgery

## 2019-03-21 NOTE — ASSESSMENT & PLAN NOTE
This problem is not controlled.  He is on high intensity statin, the LDL is still increased.  [facility]. He is very frustrated with the process of [facility] conditions with the [facility].

## 2019-03-24 RX ORDER — ZOLPIDEM TARTRATE 10 MG/1
5-10 TABLET ORAL NIGHTLY PRN
Qty: 90 TAB | Refills: 0 | Status: SHIPPED
Start: 2019-03-31 | End: 2019-03-25 | Stop reason: SDUPTHER

## 2019-03-24 NOTE — ASSESSMENT & PLAN NOTE
Ongoing problem. Previously on Adderall 20 mg, but this was switched over to Provigil, which he thought worked well, but since he started school in January 2019, he has been back on Adderall. States he did not react well to the CR in the past. One of these classes is 50/50 online versus in class, and then the other in person. He is at the campus for class 2 days a week.

## 2019-03-24 NOTE — PROGRESS NOTES
Subjective:     Chief Complaint   Patient presents with   • Referral Needed     back surgery    • Depression     score 18   • Medication Refill     ambien /adderal/labs needed       HPI  Jitendra Ugalde is a 39 y.o. male here today for referral to neurosurgery, inform me of his current depression, and be monitored for his prescriptions    Impaired glucose tolerance  Has ongoing prediabetes. Last A1C 5.8% 3 months ago.     Bulging of lumbar intervertebral disc  Ongoing chronic problem that has worsened.  Patient reports he had an MRI completed last month and we need to get these records.  He has confirmation of bulging disc and lumbar spine.  States his pain is localized to his lower back without any radiating leg pain, numbness, tingling, or weakness.  No bowel or bladder changes.  No saddle anesthesia.  He would like referral to neurosurgery    Dyslipidemia  This problem is not controlled.  He is on high intensity statin, the LDL is still increased.  [facility] to [facility]. He is very frustrated with the process of [facility].    Situational depression  Patient reports he is frustrated with his back pain.  This is limiting him from doing things he enjoys such as riding his motorcycle.  He does feel like he is experiencing some situational depression, but does not report any suicidal thoughts.  He feels like things are just not going his way right now, will but he says he does this his life and it will get better and that he just needs to push through.        Insomnia  Long-standing problem with associated PTSD. Has been on Ambien and trazodone since 2008. Has been doing well with this.     Attention deficit hyperactivity disorder (ADHD), combined type  Ongoing problem. Previously on Adderall 20 mg, but this was switched over to Provigil, which he thought worked well, but since he started school in January 2019, he has been back on Adderall. States he did not react  "well to the CR in the past. One of these classes is 50/50 online versus in class, and then the other in person. He is at the campus for class 2 days a week.          Diagnoses of Bulging of lumbar intervertebral disc, Psychophysiological insomnia, Attention deficit hyperactivity disorder (ADHD), combined type, Impaired glucose tolerance, Dyslipidemia, Situational depression, Encounter for monitoring testosterone replacement therapy, and Encounter for screening for malignant neoplasm of prostate were pertinent to this visit.    Allergies: Patient has no known allergies.  Current medicines (including changes today)  Current Outpatient Prescriptions   Medication Sig Dispense Refill   • [START ON 5/28/2019] amphetamine-dextroamphetamine (ADDERALL) 20 MG Tab Take 1 Tab by mouth every day for 30 days. 30 Tab 0   • zolpidem (AMBIEN) 10 MG Tab Take 0.5-1 Tabs by mouth at bedtime as needed for Sleep for up to 90 days. 90 Tab 0   • testosterone cypionate (DEPO-TESTOSTERONE) 200 MG/ML Solution injection 1 mL by Intramuscular route every 14 days for 90 days. 6 mL 0   • tamsulosin (FLOMAX) 0.4 MG capsule Take 2 Caps by mouth every day. 180 Cap 3   • traZODone (DESYREL) 50 MG Tab Take 1 Tab by mouth at bedtime as needed for Sleep. 90 Tab 3   • ATORVASTATIN CALCIUM PO Take  by mouth.     • naproxen (NAPROSYN) 500 MG Tab Take 500 mg by mouth 2 times a day, with meals.       No current facility-administered medications for this visit.        He  has a past medical history of ADHD; Hyperlipidemia; Hypogonadism male; Insomnia; and PTSD (post-traumatic stress disorder).        ROS  As stated in HPI and additionally  Gen: +fatigue. No insomnia or weight loss  Neuro: see hpi   CV: No chest pain, PASTOR, LE edema  Endo: no polyuria or polydipsia      Objective:     Blood pressure 126/78, pulse 91, temperature 36.4 °C (97.6 °F), temperature source Temporal, height 1.778 m (5' 10\"), weight 100.7 kg (222 lb), SpO2 94 %. Body mass index is 31.85 " kg/m².  Physical Exam:  General: Alert, oriented, in no acute distress.  Eye contact is good, speech goal directed, affect calm  CNs grossly intact.  HEENT: conjunctiva non-injected, sclera non-icteric, EOMs intact. No lid edema or eye drainage.   Gross hearing intact.  Neck: Supple. No adenopathy or masses in the cervical or supraclavicular regions. No thyromegaly  Lungs: unlabored. clear to auscultation bilaterally with good excursion.  CV: regular rate and rhythm. No murmurs.  Ext: no edema, normal color   Skin: No rashes or lesions in visible areas  Gait steady.     Assessment and Plan:   Assessment/Plan:  1. Bulging of lumbar intervertebral disc  Not controlled.  Pain worsened.  Refer for neurosurgical evaluation. Will get MRI results in chart   - REFERRAL TO NEUROSURGERY  - REFERRAL TO NEUROSURGERY    2. Psychophysiological insomnia  Stable continue treatment    3. Attention deficit hyperactivity disorder (ADHD), combined type  Stable continue treatment  - amphetamine-dextroamphetamine (ADDERALL) 20 MG Tab; Take 1 Tab by mouth every day for 30 days.  Dispense: 30 Tab; Refill: 0    4. Impaired glucose tolerance  Stable.  Labs due in 3 months  - HEMOGLOBIN A1C; Future    5. Dyslipidemia  Not controlled.  [facility]    6. Situational depression  Patient does not feel he needs any sort of medication at this time.  He does not wish to participate in therapy but he knows this is an option.  Monitor closely  - Patient has been identified as being depressed and appropriate orders and counseling have been given    7. Encounter for monitoring testosterone replacement therapy  - CBC WITH DIFFERENTIAL; Future  - PROSTATE SPECIFIC AG SCREENING; Future  - TESTOSTERONE, FREE AND TOTAL; Future    8. Encounter for screening for malignant neoplasm of prostate  - PROSTATE SPECIFIC AG SCREENING; Future       Follow up:  Return in about 6 months (around 9/21/2019).    Educated in proper administration of  medication(s) ordered today including safety, possible SE, risks, benefits, rationale and alternatives to therapy.   Supportive care, differential diagnoses, and indications for immediate follow-up discussed with patient.    Pathogenesis of diagnosis discussed including typical length and natural progression.    Instructed to return to clinic or nearest emergency department for any change in condition, further concerns, or worsening of symptoms.  Patient states understanding of the plan of care and discharge instructions.      Please note that this dictation was created using voice recognition software. I have made every reasonable attempt to correct obvious errors, but I expect that there are errors of grammar and possibly content that I did not discover before finalizing the note.    Followup: Return in about 6 months (around 9/21/2019). sooner should new symptoms or problems arise.

## 2019-03-25 DIAGNOSIS — E29.1 HYPOGONADISM MALE: ICD-10-CM

## 2019-03-25 DIAGNOSIS — F51.04 PSYCHOPHYSIOLOGICAL INSOMNIA: ICD-10-CM

## 2019-03-25 NOTE — TELEPHONE ENCOUNTER
Was the patient seen in the last year in this department? Yes 03/21/19    Does patient have an active prescription for medications requested? No     Received Request Via: Pharmacy       TITO LUDWINAVIVA     Age: 39  demographics  Data as of: 03/25/2019              NARCOTIC 200        SEDATIVE 271       STIMULANT 251       NARxSCORES can range from 000 to 999. The first two digits represent the composite percentile risk based on an overall analysis of prescription drug use. The third digit represents the number of active prescriptions. The distribution of scores in the population is such that approximately 75% fall below 200, 95% fall below 500 and 99% fall below 650. The information on this report is not warranted as accurate or complete. This report is based on the search criteria supplied and the data entered by the dispensing pharmacy. For more information about any prescription, please contact the dispensing pharmacy or the prescriber. NARxSCORES and Reports are intended to aid, not replace medical decision making. None of the information presented should be used as sole justification for providing or refusing to provide medications.       Rx Graph Grayed out drugs could not be included in score calculations.   [x] Narcotic [x] Sedative [x] Stimulant [x] Buprenorphine [x] Other    Created with Raphaël 2.2.0All Prescribers  Created with Raphaël [id number] - Kurt Monaes2 - Jean Hui3 - Vaishali Samayoa4 - Earnest Mulligan5 - [facility]  Morphine MgEq (MME)  Created with Raphaël [id number]  Created with Raphaël [id number]  Lorazepam MgEq (LME)  Created with Raphaël [id number]  Created with Raphaël [id number]  *Per CDC guidance, the MME conversion factors prescribed or provided as part of the medication-assisted treatment for opioid use disorder should not be used to benchmark against dosage thresholds meant for opioids prescribed for  "pain. Buprenorphine products have no agreed upon morphine equivalency, and as partial opioid agonists, are not expected to be associated with overdose risk in the same dose-dependent manner as doses for full agonist opioids. MME = morphine milligram equivalents. LME = Lorazepam milligram equivalents. mg = dose in milligrams.     Data Analysis   Narcotics (200) 2 months 6 months 1 year 2 years   Prescribers (narcotic, sedative) 0  0 1  12 2  16 4  21   Pharmacies (narcotic, sedative) 0  0 1  16 2  25 3  27   Morphine mg 0  0 0  0 200  38 2000  76   Morphine overlap (1) 0  0 0  0 0  0 0  0           Sedatives (271) 2 months 6 months 1 year 2 years   Prescribers (narcotic, sedative) 0  0 1  12 2  16 4  21   Pharmacies (narcotic, sedative) 0  0 1  16 2  25 3  27   Sedative mg 0  0 90  56 180  64 396  74   Sedative overlap (1) 0  0 0  0 0  0 59  25           Stimulants (251)  2 months 6 months 1 year 2 years   Prescribers (stimulant) 1  19 1  12 1  8 2  11   Pharmacies (stimulant) 1  25 1  16 1  13 1  10   Stimulant days 60  98 180  98 363  98 489  77   Stimulant overlap (1) 0  0 0  0 0  0 0  0      (1) Number of days for which a simliar type of medication was prescribed from different prescribers for use on the same day.         Summary   Total Prescriptions: 42   Total Prescribers: 6   Total Pharmacies: 5   Narcotics*    (excluding buprenorphine)  Current Qty: 0   Current MME/day: 0.00   30 Day Avg MME/day: 0.00   Buprenorphine*   Current Qty: 0   Current mg/day: 0.00   30 Day Avg mg/day: 0.00   Sedatives*     Zolpidem Tartrate 10 Mg Tablet : 2\"> Current Qty: 2   Current LME/day: 0.50    30 Day Avg LME/day: 0.50      *Highlighted drugs could not be included in score calculations   PRESCRIPTIONS  Total Prescriptions: 42   Total Private Pay: 1   Fill Date ID Written Drug Qty Days Prescriber Rx # Pharmacy Refill Daily Dose * Pymt Type    02/27/2019  1   12/26/2018  " Dextroamp-Amphetamin 20 MG Tab  30 30 [address] [place]  [id number] Saf (6215)  1  [place]   02/11/2019  1   02/11/2019  Testosterone Cyp 200 Mg/Ml  6 [address] [place]  [id number] Saf (6215)  0  [place]   01/28/2019  1   12/26/2018  Dextroamp-Amphetamin 20 MG Tab  30 30 Mo [place]  [id number] Saf (6215)  1  [place]   01/07/2019  1   12/26/2018  Testosterone Cyp 200 Mg/Ml  6 90 Mo [place]  [id number] Saf (6215)  1  [place]   12/29/2018  1   12/26/2018  Dextroamp-Amphetamin 20 MG Tab  30 30 Mo [place]  [id number] Saf (6215)  1  [place]   12/28/2018  1   12/26/2018  Zolpidem Tartrate 10 MG Tablet  90 90 Mo Dov  [id number] Saf (6215)  0 0.50 LME  [place]   10/09/2018  1   10/09/2018  Testosterone Cyp 200 Mg/Ml  6 90 Mo [place]  [id number] Saf (6215)  0  Other  [place]   10/02/2018  1   10/02/2018  Modafinil 200 MG Tablet  90 90 Mo [place]  [id number] Saf (6215)  0  Other  [place]   10/02/2018  1   10/02/2018  Zolpidem Tartrate 10 MG Tablet  90 90 Mo [place]  [id number] Saf (6215)  0 0.50 LME  Other  [place]   09/13/2018  2   09/11/2018  Hydrocodone-Acetamin 5-325 MG  20 5 Ch [place]  [id number] Va (1013)  0 20.00 MME  /[place]  [place]   08/31/2018  2   08/31/2018  Hydrocodone-Acetamin 5-325 MG  20 5 Ch [place]  [id number] Va (1013)  0 20.00 MME  /[place]  [place]   07/02/2018  1   06/29/2018  Modafinil 200 MG Tablet  90 90 Mo [place]  [id number] Saf (6215)  0  Other  [place]   07/02/2018  1   04/02/2018  Testosterone Cyp 200 Mg/Ml  6 90 Mo [place]  [id number] Saf (6215)  0  Other  [place]   06/29/2018  1   06/29/2018  Modafinil 200 MG Tablet  90 90 Mo [place]  [id number] Saf (6215)  0  Other  [place]   06/29/2018  1   06/29/2018  Zolpidem Tartrate 10 MG Tablet  90 90 Mo Dov  [id number] Saf (6215)  0 0.50 LME  Other  [place]   04/03/2018  1   04/02/2018  Testosterone Cyp 200 Mg/Ml  6 84 Mo Dov  [id number] Saf (6215)  0  Other  [place]   03/30/2018  1   03/30/2018  Modafinil 200 MG Tablet  90 90 Mo [place]  [id number] Saf (6215)  0  Other  [place]   03/30/2018  1   03/30/2018  Zolpidem Tartrate 10 MG Tablet  90 90 Mo Dov  [id number] Saf (6215)  0 0.50 LME   Other

## 2019-03-26 RX ORDER — ZOLPIDEM TARTRATE 10 MG/1
TABLET ORAL
Qty: 90 TAB | Refills: 0 | Status: SHIPPED
Start: 2019-03-26 | End: 2019-06-24 | Stop reason: SDUPTHER

## 2019-03-26 RX ORDER — TESTOSTERONE CYPIONATE 200 MG/ML
200 INJECTION, SOLUTION INTRAMUSCULAR
Qty: 6 ML | Refills: 0 | OUTPATIENT
Start: 2019-03-26 | End: 2019-06-24

## 2019-03-26 NOTE — TELEPHONE ENCOUNTER
From: Jitendra Ugalde  Sent: 3/25/2019 6:41 PM PDT  Subject: Medication Renewal Request    Jitendra Ugalde would like a refill of the following medications:     testosterone cypionate (DEPO-TESTOSTERONE) 200 MG/ML Solution injection [TAMMIE WaltersRJodiNJodi]    Preferred pharmacy: [facility] - [place], [place] - [address]

## 2019-04-04 ENCOUNTER — PATIENT MESSAGE (OUTPATIENT)
Dept: MEDICAL GROUP | Facility: PHYSICIAN GROUP | Age: 39
End: 2019-04-04

## 2019-06-20 LAB
BASOPHILS # BLD AUTO: 0 X10E3/UL (ref 0–0.2)
BASOPHILS NFR BLD AUTO: 0 %
EOSINOPHIL # BLD AUTO: 0.1 X10E3/UL (ref 0–0.4)
EOSINOPHIL NFR BLD AUTO: 1 %
ERYTHROCYTE [DISTWIDTH] IN BLOOD BY AUTOMATED COUNT: 15.3 % (ref 12.3–15.4)
HBA1C MFR BLD: 5.8 % (ref 4.8–5.6)
HCT VFR BLD AUTO: 46 % (ref 37.5–51)
HGB BLD-MCNC: 14.5 G/DL (ref 13–17.7)
IMM GRANULOCYTES # BLD AUTO: 0 X10E3/UL (ref 0–0.1)
IMM GRANULOCYTES NFR BLD AUTO: 0 %
IMMATURE CELLS  115398: ABNORMAL
LYMPHOCYTES # BLD AUTO: 2 X10E3/UL (ref 0.7–3.1)
LYMPHOCYTES NFR BLD AUTO: 19 %
MCH RBC QN AUTO: 25 PG (ref 26.6–33)
MCHC RBC AUTO-ENTMCNC: 31.5 G/DL (ref 31.5–35.7)
MCV RBC AUTO: 79 FL (ref 79–97)
MONOCYTES # BLD AUTO: 1.2 X10E3/UL (ref 0.1–0.9)
MONOCYTES NFR BLD AUTO: 11 %
MORPHOLOGY BLD-IMP: ABNORMAL
NEUTROPHILS # BLD AUTO: 7.3 X10E3/UL (ref 1.4–7)
NEUTROPHILS NFR BLD AUTO: 69 %
NRBC BLD AUTO-RTO: ABNORMAL %
PLATELET # BLD AUTO: 254 X10E3/UL (ref 150–450)
PSA SERPL-MCNC: 1.1 NG/ML (ref 0–4)
RBC # BLD AUTO: 5.8 X10E6/UL (ref 4.14–5.8)
TESTOST FREE SERPL-MCNC: 2.4 PG/ML (ref 6.8–21.5)
TESTOST SERPL-MCNC: 52 NG/DL (ref 264–916)
WBC # BLD AUTO: 10.7 X10E3/UL (ref 3.4–10.8)

## 2019-06-24 ENCOUNTER — OFFICE VISIT (OUTPATIENT)
Dept: MEDICAL GROUP | Facility: PHYSICIAN GROUP | Age: 39
End: 2019-06-24
Payer: OTHER GOVERNMENT

## 2019-06-24 VITALS
WEIGHT: 226 LBS | HEIGHT: 70 IN | BODY MASS INDEX: 32.35 KG/M2 | TEMPERATURE: 97.5 F | OXYGEN SATURATION: 98 % | RESPIRATION RATE: 16 BRPM | DIASTOLIC BLOOD PRESSURE: 80 MMHG | SYSTOLIC BLOOD PRESSURE: 130 MMHG | HEART RATE: 104 BPM

## 2019-06-24 DIAGNOSIS — E29.1 HYPOGONADISM MALE: ICD-10-CM

## 2019-06-24 DIAGNOSIS — F90.2 ATTENTION DEFICIT HYPERACTIVITY DISORDER (ADHD), COMBINED TYPE: ICD-10-CM

## 2019-06-24 DIAGNOSIS — M51.36 BULGING OF LUMBAR INTERVERTEBRAL DISC: ICD-10-CM

## 2019-06-24 DIAGNOSIS — F51.04 PSYCHOPHYSIOLOGICAL INSOMNIA: ICD-10-CM

## 2019-06-24 DIAGNOSIS — E66.9 OBESITY (BMI 30-39.9): ICD-10-CM

## 2019-06-24 DIAGNOSIS — E78.5 DYSLIPIDEMIA: ICD-10-CM

## 2019-06-24 PROCEDURE — 99214 OFFICE O/P EST MOD 30 MIN: CPT | Performed by: NURSE PRACTITIONER

## 2019-06-24 RX ORDER — ZOLPIDEM TARTRATE 10 MG/1
TABLET ORAL
Qty: 90 TAB | Refills: 1 | Status: SHIPPED | OUTPATIENT
Start: 2019-06-24 | End: 2019-09-24

## 2019-06-24 RX ORDER — DEXTROAMPHETAMINE SACCHARATE, AMPHETAMINE ASPARTATE, DEXTROAMPHETAMINE SULFATE AND AMPHETAMINE SULFATE 5; 5; 5; 5 MG/1; MG/1; MG/1; MG/1
20 TABLET ORAL DAILY
Qty: 30 EACH | Refills: 0 | Status: SHIPPED | OUTPATIENT
Start: 2019-08-28 | End: 2019-09-25 | Stop reason: SDUPTHER

## 2019-06-24 RX ORDER — DEXTROAMPHETAMINE SACCHARATE, AMPHETAMINE ASPARTATE, DEXTROAMPHETAMINE SULFATE AND AMPHETAMINE SULFATE 5; 5; 5; 5 MG/1; MG/1; MG/1; MG/1
20 TABLET ORAL DAILY
Qty: 30 EACH | Refills: 0 | Status: SHIPPED | OUTPATIENT
Start: 2019-06-29 | End: 2019-06-24 | Stop reason: SDUPTHER

## 2019-06-24 RX ORDER — DEXTROAMPHETAMINE SACCHARATE, AMPHETAMINE ASPARTATE, DEXTROAMPHETAMINE SULFATE AND AMPHETAMINE SULFATE 5; 5; 5; 5 MG/1; MG/1; MG/1; MG/1
20 TABLET ORAL DAILY
Qty: 30 EACH | Refills: 0 | Status: SHIPPED | OUTPATIENT
Start: 2019-07-29 | End: 2019-06-24 | Stop reason: SDUPTHER

## 2019-06-24 NOTE — ASSESSMENT & PLAN NOTE
This is a chronic problem for the patient that is ongoing and will the patient has been referred to [facility] for this issue and will be receiving an ablation in a couple of weeks.  Patient continues to have low back pain, denies any radiating leg pain, numbness, tingling, or weakness.  No bowel or bladder changes.  No saddle anesthesia.  Patient states that if ablation does not improve symptoms he will likely need a referral to neurosurgery for surgical intervention.

## 2019-06-24 NOTE — ASSESSMENT & PLAN NOTE
This is a chronic problem for the patient that is ongoing and related to PTSD.  The patient has been taking Ambien and trazodone for the.  The patient reports that he has tried a lot of other medications for this issue without relief.  Patient states that he knows he is reliant on the Ambien as he cannot even take a nap without it.  He does not particularly like taking the trazodone due to the way that it makes him feel the next day, but the combination of the Ambien and trazodone have been working well. States that he took more ambien than prescribed recently due to difficulty sleeping related to a possible sinus infection and has run out of the medication early. Patient reports that he previously used marijuana, but has not used since being told he could not use marijuana and continue receiving controlled substance prescriptions.

## 2019-06-24 NOTE — ASSESSMENT & PLAN NOTE
This is a chronic problem for the patient that is ongoing.  The patient has been taking Adderall 20 mg/day, takes 10 mg in the morning and takes the other 10 mg around 11 AM.  Reports that he did not react well to the controlled release in the past. Patient needs a refill today.

## 2019-06-24 NOTE — ASSESSMENT & PLAN NOTE
This is a chronic problem for the patient that is uncontrolled, but improving.  The patient continues to take atorvastatin for this issue, but does not recall the dose.  Denies any side effects from the medication.   3/7/2018 10:57 12/21/2018 12:03   Cholesterol,Tot 256 (H) 207 (H)   Triglycerides 193 (H) 125   HDL 15 (L) 29 (A)    (H) 153 (H)

## 2019-06-25 NOTE — ASSESSMENT & PLAN NOTE
"This is a chronic problem for the patient that is ongoing.  Patient reports that he has been under increased stress, not exercising as he should, and eating poorly.  States that his home environment should be improving soon and he will have a friend move in with him who will work with him on exercise, healthy diet, and weight loss.  Vitals 10/2/2018 12/26/2018 3/21/2019 6/24/2019   WEIGHT 226 220 222 226   HEIGHT 5' 9\" 5' 10\" 5' 10\" 5' 10\"   BMI 33.37 kg/m2 31.57 kg/m2 31.85 kg/m2 32.43 kg/m2     "

## 2019-06-25 NOTE — PROGRESS NOTES
CC:   Chief Complaint   Patient presents with   • Hyperlipidemia   • ADHD   • Anxiety   • Insomnia   • Establish Care     HISTORY OF THE PRESENT ILLNESS: Patient is a 39 y.o. male. This pleasant patient is here today to establish care and discuss multiple issues as listed below.    Health Maintenance: Completed    Bulging of lumbar intervertebral disc  This is a chronic problem for the patient that is ongoing and will the patient has been referred to [facility] for this issue and will be receiving an ablation in a couple of weeks.  Patient continues to have low back pain, denies any radiating leg pain, numbness, tingling, or weakness.  No bowel or bladder changes.  No saddle anesthesia.  Patient states that if ablation does not improve symptoms he will likely need a referral to neurosurgery for surgical intervention.    Insomnia  This is a chronic problem for the patient that is ongoing and related to PTSD.  The patient has been taking Ambien and trazodone for the.  The patient reports that he has tried a lot of other medications for this issue without relief.  Patient states that he knows he is reliant on the Ambien as he cannot even take a nap without it.  He does not particularly like taking the trazodone due to the way that it makes him feel the next day, but the combination of the Ambien and trazodone have been working well. States that he took more ambien than prescribed recently due to difficulty sleeping related to a possible sinus infection and has run out of the medication early. Patient reports that he previously used marijuana, but has not used since being told he could not use marijuana and continue receiving controlled substance prescriptions.    Dyslipidemia  This is a chronic problem for the patient that is uncontrolled, but improving.  The patient continues to take atorvastatin for this issue, but does not recall the dose.  Denies any side effects from the medication.   3/7/2018  "10:57 12/21/2018 12:03   Cholesterol,Tot 256 (H) 207 (H)   Triglycerides 193 (H) 125   HDL 15 (L) 29 (A)    (H) 153 (H)       Attention deficit hyperactivity disorder (ADHD), combined type  This is a chronic problem for the patient that is ongoing.  The patient has been taking Adderall 20 mg/day, takes 10 mg in the morning and takes the other 10 mg around 11 AM.  Reports that he did not react well to the controlled release in the past. Patient needs a refill today.    Hypogonadism male  This is a chronic problem for the patient that is ongoing.  The patient was being managed by PCP as well as a specialist for his low testosterone replacement for hypogonadism.  Patient states that he could no longer follow-up with a specialist due to co-pay cost and insurance not covering it.  Reports that he feels better when receiving testosterone replacement, specifically improved libido, improve strength, improve muscle mass.  He was previously receiving IM testosterone 200 mg every other week.    Obesity (BMI 30-39.9)  This is a chronic problem for the patient that is ongoing.  Patient reports that he has been under increased stress, not exercising as he should, and eating poorly.  States that his home environment should be improving soon and he will have a friend move in with him who will work with him on exercise, healthy diet, and weight loss.  Vitals 10/2/2018 12/26/2018 3/21/2019 6/24/2019   WEIGHT 226 220 222 226   HEIGHT 5' 9\" 5' 10\" 5' 10\" 5' 10\"   BMI 33.37 kg/m2 31.57 kg/m2 31.85 kg/m2 32.43 kg/m2     Allergies: Patient has no known allergies.    Current Outpatient Prescriptions Ordered in [facility]   Medication Sig Dispense Refill   • zolpidem (AMBIEN) 10 MG Tab TAKE 1/2 TO 1 TABLET BY MOUTH AT BEDTIME AS NEEDED FOR SLEEP FOR UP TO 90 DAYS 90 Tab 1   • [START ON 8/28/2019] amphetamine-dextroamphetamine (ADDERALL) 20 MG Tab Take 1 Tab by mouth every day for 30 days. 30 Each 0   • ATORVASTATIN CALCIUM PO Take  by " mouth.     • tamsulosin (FLOMAX) 0.4 MG capsule Take 2 Caps by mouth every day. 180 Cap 3   • traZODone (DESYREL) 50 MG Tab Take 1 Tab by mouth at bedtime as needed for Sleep. 90 Tab 3   • naproxen (NAPROSYN) 500 MG Tab Take 500 mg by mouth 2 times a day, with meals.       No current Epic-ordered facility-administered medications on file.        Past Medical History:   Diagnosis Date   • ADHD    • Hyperlipidemia    • Hypogonadism male    • Insomnia    • PTSD (post-traumatic stress disorder)        Past Surgical History:   Procedure Laterality Date   • CARPAL TUNNEL RELEASE Bilateral        Social History   Substance Use Topics   • Smoking status: Former Smoker     Years: 2.00     Types: Cigarettes     Quit date: 6/24/1999   • Smokeless tobacco: Never Used      Comment: only smoked off and on for 2 years   • Alcohol use No       Social History     Social History Narrative   • No narrative on file       Family History   Problem Relation Age of Onset   • Hyperlipidemia Mother    • Hypertension Mother    • Stroke Maternal Grandmother    • Diabetes Maternal Grandfather    • Stroke Maternal Grandfather    • Diabetes Paternal Grandmother    • Stroke Paternal Grandmother    • No Known Problems Paternal Grandfather    • No Known Problems Son    • No Known Problems Sister    • No Known Problems Sister    • No Known Problems Sister    • No Known Problems Sister      ROS:   Constitutional: Positive for insomnia.  Negative for fever, chills, unexpected weight change, night sweats, body aches,  and fatigue/generalized weakness.   HEENT: Positive for sinus congestion. Negative for headaches, vision changes, hearing changes, ear pain, tinnitus, ear discharge, rhinorrhea, sneezing, sore throat, and neck pain.    Respiratory:  Negative for cough, shortness of breath, sputum production, hemoptysis, chest congestion, dyspnea, wheezing, and crackles.    Cardiovascular:  Negative for chest pain, palpitations, PASTOR, paroxsymal nocturnal  "dyspnea, orthopnea, and bilateral lower extremity edema.   Gastrointestinal:  Negative for heartburn, nausea, vomiting, abdominal pain, hematochezia, melena, diarrhea, constipation, and greasy/foul-smelling stools.   Genitourinary: Positive for hypogonadism.  Negative for dysuria, nocturia, polyuria, hematuria, pyuria, urinary urgency, urinary frequency, and urinary incontinence.   Musculoskeletal: Positive for chronic low back pain.  Negative for myalgias and joint pain.   Skin:  Negative for rash, sores, lumps, itching, cyanotic skin color change.   Neurological:  Negative for dizziness, tingling, tremors, focal sensory deficit, focal weakness and headaches.   Psychiatric/Behavioral: Positive for situational depression, anxiety, PTSD, ADHD, and memory loss.  Negative for suicidal/homicidal ideation.        Exam: /80 (BP Location: Right arm, Patient Position: Sitting, BP Cuff Size: Adult)   Pulse (!) 104   Temp 36.4 °C (97.5 °F) (Temporal)   Resp 16   Ht 1.778 m (5' 10\")   Wt 102.5 kg (226 lb)   SpO2 98%  Body mass index is 32.43 kg/m².    General:  Normal appearing. No distress.  HEENT:  Normocephalic. Eyes conjunctiva clear lids without ptosis, pupils equal and reactive to light accommodation, ears normal shape and contour, canals are clear bilaterally, tympanic membranes are benign, nasal mucosa benign, oropharynx is without erythema, edema or exudates.  Neck:  Supple without JVD or bruit.  Pulmonary:  Clear to ausculation.  Normal effort. No rales, ronchi, or wheezing.  Cardiovascular:  Regular rate and rhythm without murmur. Carotid and radial pulses are intact and equal bilaterally.  Abdomen:  Soft, nontender, nondistended. Normal bowel sounds.  Neurologic:  Grossly nonfocal.  Skin:  Warm and dry.  No obvious lesions.  Musculoskeletal:  Normal gait. No extremity cyanosis, clubbing, or edema.  Psych:  Normal mood and affect. Alert and oriented x3. Judgment and insight is " normal.    Assessment/Plan:  1. Attention deficit hyperactivity disorder (ADHD), combined type  amphetamine-dextroamphetamine (ADDERALL) 20 MG Tab  Return in 2 months for refill   2. Psychophysiological insomnia  zolpidem (AMBIEN) 10 MG Tab  Continue trazodone 50 mg nightly  Do not take more medication than prescribed, early medication requests will not be refilled   3. Hypogonadism male  REFERRAL TO UROLOGY-for management of this issue and to discuss testosterone replacement   4. Obesity (BMI 30-39.9)  Patient identified as having weight management issue.  Appropriate orders and counseling given.   5. Dyslipidemia  Continue atorvastatin daily, please update me with dose   6. Bulging of lumbar intervertebral disc  Continue follow-up with [facility]     Educated in proper administration of medication(s) ordered today including safety, possible SE, risks, benefits, rationale and alternatives to therapy.   Supportive care, differential diagnoses, and indications for immediate follow-up discussed with patient.    Pathogenesis of diagnosis discussed including typical length and natural progression.    Instructed to return to clinic or nearest emergency department for any change in condition, further concerns, or worsening of symptoms.  Patient states understanding of the plan of care and discharge instructions.     Return in about 3 months (around 9/24/2019) for ADHD, Follow up Medications, Medication Refill.    Please note that this dictation was created using voice recognition software. I have made every reasonable attempt to correct obvious errors, but I expect that there are errors of grammar and possibly content that I did not discover before finalizing the note.

## 2019-07-31 DIAGNOSIS — F51.04 PSYCHOPHYSIOLOGICAL INSOMNIA: ICD-10-CM

## 2019-07-31 RX ORDER — TRAZODONE HYDROCHLORIDE 50 MG/1
50 TABLET ORAL
Qty: 90 TAB | Refills: 3 | Status: SHIPPED
Start: 2019-07-31 | End: 2019-12-23 | Stop reason: SDUPTHER

## 2019-08-01 NOTE — TELEPHONE ENCOUNTER
Requested Prescriptions     Pending Prescriptions Disp Refills   • traZODone (DESYREL) 50 MG Tab 90 Tab 3     Sig: Take 1 Tab by mouth at bedtime as needed for Sleep.       Myrna Melendrez A.P.R.N.

## 2019-09-16 DIAGNOSIS — E29.1 HYPOGONADISM MALE: ICD-10-CM

## 2019-09-16 RX ORDER — TAMSULOSIN HYDROCHLORIDE 0.4 MG/1
CAPSULE ORAL
Qty: 180 CAP | Refills: 0 | Status: SHIPPED | OUTPATIENT
Start: 2019-09-16 | End: 2020-03-16

## 2019-09-16 NOTE — TELEPHONE ENCOUNTER
Was the patient seen in the last year in this department? Yes LOV 6/24/19    Does patient have an active prescription for medications requested? No     Received Request Via: Pharmacy

## 2019-09-17 NOTE — TELEPHONE ENCOUNTER
Requested Prescriptions     Pending Prescriptions Disp Refills   • tamsulosin (FLOMAX) 0.4 MG capsule [Pharmacy Med Name: Tamsulosin Hydrochloride 0.4 Mg Cap Nort] 180 Cap 0     Sig: TAKE TWO CAPSULES BY MOUTH DAILY       VERO Turcios.

## 2019-09-25 ENCOUNTER — OFFICE VISIT (OUTPATIENT)
Dept: MEDICAL GROUP | Facility: PHYSICIAN GROUP | Age: 39
End: 2019-09-25
Payer: OTHER GOVERNMENT

## 2019-09-25 VITALS
DIASTOLIC BLOOD PRESSURE: 70 MMHG | TEMPERATURE: 98.6 F | HEART RATE: 93 BPM | SYSTOLIC BLOOD PRESSURE: 112 MMHG | HEIGHT: 70 IN | WEIGHT: 211 LBS | BODY MASS INDEX: 30.21 KG/M2 | OXYGEN SATURATION: 94 %

## 2019-09-25 DIAGNOSIS — M79.642 LEFT HAND PAIN: ICD-10-CM

## 2019-09-25 DIAGNOSIS — F90.2 ATTENTION DEFICIT HYPERACTIVITY DISORDER (ADHD), COMBINED TYPE: ICD-10-CM

## 2019-09-25 DIAGNOSIS — E29.1 HYPOGONADISM MALE: ICD-10-CM

## 2019-09-25 PROCEDURE — 99214 OFFICE O/P EST MOD 30 MIN: CPT | Performed by: NURSE PRACTITIONER

## 2019-09-25 RX ORDER — DEXTROAMPHETAMINE SACCHARATE, AMPHETAMINE ASPARTATE, DEXTROAMPHETAMINE SULFATE AND AMPHETAMINE SULFATE 2.5; 2.5; 2.5; 2.5 MG/1; MG/1; MG/1; MG/1
10 TABLET ORAL 2 TIMES DAILY
Qty: 60 TAB | Refills: 0 | Status: SHIPPED | OUTPATIENT
Start: 2019-11-24 | End: 2019-12-23 | Stop reason: SDUPTHER

## 2019-09-25 RX ORDER — DEXTROAMPHETAMINE SACCHARATE, AMPHETAMINE ASPARTATE, DEXTROAMPHETAMINE SULFATE AND AMPHETAMINE SULFATE 2.5; 2.5; 2.5; 2.5 MG/1; MG/1; MG/1; MG/1
10 TABLET ORAL 2 TIMES DAILY
Qty: 60 TAB | Refills: 0 | Status: SHIPPED | OUTPATIENT
Start: 2019-09-25 | End: 2019-09-25 | Stop reason: SDUPTHER

## 2019-09-25 RX ORDER — ANASTROZOLE 1 MG/1
1 TABLET ORAL
COMMUNITY
Start: 2019-09-19

## 2019-09-25 RX ORDER — DEXTROAMPHETAMINE SACCHARATE, AMPHETAMINE ASPARTATE, DEXTROAMPHETAMINE SULFATE AND AMPHETAMINE SULFATE 2.5; 2.5; 2.5; 2.5 MG/1; MG/1; MG/1; MG/1
10 TABLET ORAL 2 TIMES DAILY
Qty: 60 TAB | Refills: 0 | Status: SHIPPED | OUTPATIENT
Start: 2019-10-25 | End: 2019-09-25 | Stop reason: SDUPTHER

## 2019-09-25 NOTE — PROGRESS NOTES
CC:   Chief Complaint   Patient presents with   • Medication Refill     Adderall   • Pain     Left hand thumb, poss dislocated     HISTORY OF THE PRESENT ILLNESS: Patient is a 39 y.o. male. This pleasant patient is here today to discuss left hand pain and get refill of medications for ADHD.    Health Maintenance:  Advised flu shot will be available early September, encouraged MA or office visit to receive flu injection for this season.      Hypogonadism male  Chronic problem for the patient that is managed by Endocrine. The patient is currently taking anastrozole 1 mg once daily on Monday, Wednesday, and Fridays.     Attention deficit hyperactivity disorder (ADHD), combined type  This is a chronic problem for the patient that is ongoing.  The patient has been taking Adderall 10 mg in the morning and 10 mg around 11 AM. Continues to do well with this dosing and scheduling. Denies side effects of the medication. Reports that he did not react well to the controlled release in the past. Patient needs a refill today.    Left hand pain  This is a new problem to the examiner. Patient reports that he was playing with his dog a week ago and had a fall down onto his left hand with his palm open. He has had decreased ROM and mild pain in his left hand thumb since. He has tried topical CBD oil, which helped with the palm pain, but continues to have thumb pain.    Allergies: Patient has no known allergies.    Current Outpatient Medications Ordered in Epic   Medication Sig Dispense Refill   • [START ON 11/24/2019] amphetamine-dextroamphetamine (ADDERALL) 10 MG Tab Take 1 Tab by mouth 2 Times a Day for 30 days. 60 Tab 0   • tamsulosin (FLOMAX) 0.4 MG capsule TAKE TWO CAPSULES BY MOUTH DAILY  180 Cap 0   • traZODone (DESYREL) 50 MG Tab Take 1 Tab by mouth at bedtime as needed for Sleep. 90 Tab 3   • ATORVASTATIN CALCIUM PO Take  by mouth.     • naproxen (NAPROSYN) 500 MG Tab Take 500 mg by mouth 2 times a day, with meals.     •  anastrozole (ARIMIDEX) 1 MG Tab Take 1 mg by mouth 3 times a week.       No current Epic-ordered facility-administered medications on file.      Past Medical History:   Diagnosis Date   • ADHD    • Hyperlipidemia    • Hypogonadism male    • Insomnia    • PTSD (post-traumatic stress disorder)      Past Surgical History:   Procedure Laterality Date   • CARPAL TUNNEL RELEASE Bilateral      Social History     Tobacco Use   • Smoking status: Former Smoker     Years: 2.00     Types: Cigarettes     Last attempt to quit: 1999     Years since quittin.2   • Smokeless tobacco: Never Used   • Tobacco comment: only smoked off and on for 2 years   Substance Use Topics   • Alcohol use: No   • Drug use: Yes     Types: Marijuana     Comment: reports marijuana use once every few months     Social History     Social History Narrative   • Not on file     Family History   Problem Relation Age of Onset   • Hyperlipidemia Mother    • Hypertension Mother    • Stroke Maternal Grandmother    • Diabetes Maternal Grandfather    • Stroke Maternal Grandfather    • Diabetes Paternal Grandmother    • Stroke Paternal Grandmother    • No Known Problems Paternal Grandfather    • No Known Problems Son    • No Known Problems Sister    • No Known Problems Sister    • No Known Problems Sister    • No Known Problems Sister      ROS:   Constitutional: Positive for insomnia.  Negative for fever, chills, unexpected weight change, night sweats, body aches,  and fatigue/generalized weakness.   HEENT: Negative for headaches, vision changes, hearing changes, ear pain, tinnitus, ear discharge, rhinorrhea, sinus congestion, sneezing, sore throat, and neck pain.    Respiratory:  Negative for cough, shortness of breath, sputum production, hemoptysis, chest congestion, dyspnea, wheezing, and crackles.    Cardiovascular:  Negative for chest pain, palpitations, PASTOR, paroxsymal nocturnal dyspnea, orthopnea, and bilateral lower extremity edema.  "  Gastrointestinal:  Negative for heartburn, nausea, vomiting, abdominal pain, hematochezia, melena, diarrhea, constipation, and greasy/foul-smelling stools.   Genitourinary: Positive for hypogonadism.  Negative for dysuria, nocturia, polyuria, hematuria, pyuria, urinary urgency, urinary frequency, and urinary incontinence.   Musculoskeletal: Positive for chronic low back pain and new left hand pain.  Negative for myalgias and joint pain.   Skin:  Negative for rash, sores, lumps, itching, cyanotic skin color change.   Neurological:  Negative for dizziness, tingling, tremors, focal sensory deficit, focal weakness and headaches.   Psychiatric/Behavioral: Positive for situational depression, anxiety, PTSD, ADHD, and memory loss.  Negative for suicidal/homicidal ideation.        Exam: /70 (BP Location: Left arm, Patient Position: Sitting, BP Cuff Size: Large adult)   Pulse 93   Temp 37 °C (98.6 °F) (Temporal)   Ht 1.778 m (5' 10\")   Wt 95.7 kg (211 lb)   SpO2 94%  Body mass index is 30.28 kg/m².    General: Well nourished, well developed male in NAD  HEENT: Atraumatic, normocephalic, extraocular movements intact, pupils equal and reactive to light  Neck: Supple, FROM  Chest: No deformities, Equal chest expansion  Respiratory: Unlabored, no stridor or audible wheeze  Abdomen: Non-Distended  Extremities: Left hand thumb pain with movement, decreased ROM, mild swelling. No Clubbing, Cyanosis  Skin: Warm/dry, without rashes  Neuro: A/O x 4, CN 2-12 Grossly intact, Motor/sensory grossly intact  Psych: Normal behavior, normal affect    Assessment/Plan:  1. Attention deficit hyperactivity disorder (ADHD), combined type  Continue adderall twice daily, 10 mg in the morning and 10 mg at lunch. Return in 3 months for refills.    - amphetamine-dextroamphetamine (ADDERALL) 10 MG Tab; Take 1 Tab by mouth 2 Times a Day for 30 days.  Dispense: 60 Tab; Refill: 0  - amphetamine-dextroamphetamine (ADDERALL) 10 MG Tab; Take 1 " Tab by mouth 2 Times a Day for 30 days.  Dispense: 60 Tab; Refill: 0  - amphetamine-dextroamphetamine (ADDERALL) 10 MG Tab; Take 1 Tab by mouth 2 Times a Day for 30 days.  Dispense: 60 Tab; Refill: 0    2. Left hand pain  Patient declines xray of his left hand, [facility].     3. Hypogonadism male  Continue follow up with endocrine. Continue medications as prescribed by endocrine.   - anastrozole (ARIMIDEX) 1 MG Tab; Take 1 mg by mouth 3 times a week.    Educated in proper administration of medication(s) ordered today including safety, possible SE, risks, benefits, rationale and alternatives to therapy.   Supportive care, differential diagnoses, and indications for immediate follow-up discussed with patient.    Pathogenesis of diagnosis discussed including typical length and natural progression.    Instructed to return to clinic or nearest emergency department for any change in condition, further concerns, or worsening of symptoms.  Patient states understanding of the plan of care and discharge instructions.     Return in about 3 months (around 12/24/2019) for ADHD, Medication Refill.    Please note that this dictation was created using voice recognition software. I have made every reasonable attempt to correct obvious errors, but I expect that there are errors of grammar and possibly content that I did not discover before finalizing the note.

## 2019-09-25 NOTE — ASSESSMENT & PLAN NOTE
This is a new problem to the examiner. Patient reports that he was playing with his dog a week ago and had a fall down onto his left hand with his palm open. He has had decreased ROM and mild pain in his left hand thumb since. He has tried topical CBD oil, which helped with the palm pain, but continues to have thumb pain.

## 2019-09-25 NOTE — ASSESSMENT & PLAN NOTE
Chronic problem for the patient that is managed by Endocrine. The patient is currently taking anastrozole 1 mg once daily on Monday, Wednesday, and Fridays.

## 2019-09-25 NOTE — ASSESSMENT & PLAN NOTE
This is a chronic problem for the patient that is ongoing.  The patient has been taking Adderall 10 mg in the morning and 10 mg around 11 AM. Continues to do well with this dosing and scheduling. Denies side effects of the medication. Reports that he did not react well to the controlled release in the past. Patient needs a refill today.

## 2019-10-22 ENCOUNTER — TELEPHONE (OUTPATIENT)
Dept: MEDICAL GROUP | Facility: PHYSICIAN GROUP | Age: 39
End: 2019-10-22

## 2019-10-22 NOTE — TELEPHONE ENCOUNTER
Phone Number Called: [phone] (home)     Call outcome: left message for patient to call back regarding message below    Message: Left a detailed that he can only have one provider filling his medications and to call with the name of provider who also wrote  Trazodone prescription for him.

## 2019-10-22 NOTE — TELEPHONE ENCOUNTER
Phone Number Called: [phone]     Call outcome: [facility]    Message: Spoke with Rodríguez at [facility] and the Patient has refills available. When the Pharmacy ran the order it stated it was to early. Rodríguez called Pt insurance and apparently he had it filled at the [facility] on 10/20/19.

## 2019-10-22 NOTE — TELEPHONE ENCOUNTER
Patient stopped in today to say he went to [facility] to see when his Trazadone refill would be ready and they said he did not have any refills. He thought he had 2 more and that it should be ready to be picked up on 10/31/19. Advised patient I would send this note to Myrna and have it looked into. If we need t send it to [facility] again we will.

## 2019-12-23 ENCOUNTER — OFFICE VISIT (OUTPATIENT)
Dept: MEDICAL GROUP | Facility: PHYSICIAN GROUP | Age: 39
End: 2019-12-23
Payer: OTHER GOVERNMENT

## 2019-12-23 VITALS
BODY MASS INDEX: 29.2 KG/M2 | HEIGHT: 70 IN | DIASTOLIC BLOOD PRESSURE: 78 MMHG | HEART RATE: 78 BPM | SYSTOLIC BLOOD PRESSURE: 118 MMHG | OXYGEN SATURATION: 95 % | TEMPERATURE: 97.8 F | WEIGHT: 204 LBS

## 2019-12-23 DIAGNOSIS — E78.5 DYSLIPIDEMIA: ICD-10-CM

## 2019-12-23 DIAGNOSIS — F90.2 ATTENTION DEFICIT HYPERACTIVITY DISORDER (ADHD), COMBINED TYPE: ICD-10-CM

## 2019-12-23 DIAGNOSIS — F51.04 PSYCHOPHYSIOLOGICAL INSOMNIA: ICD-10-CM

## 2019-12-23 PROCEDURE — 99214 OFFICE O/P EST MOD 30 MIN: CPT | Performed by: NURSE PRACTITIONER

## 2019-12-23 RX ORDER — ZOLPIDEM TARTRATE 10 MG/1
TABLET ORAL
COMMUNITY
End: 2019-12-23 | Stop reason: SDUPTHER

## 2019-12-23 RX ORDER — TRAZODONE HYDROCHLORIDE 150 MG/1
150 TABLET ORAL
Qty: 30 TAB | Refills: 5 | Status: SHIPPED | OUTPATIENT
Start: 2019-12-23 | End: 2020-01-22

## 2019-12-23 RX ORDER — DEXTROAMPHETAMINE SACCHARATE, AMPHETAMINE ASPARTATE, DEXTROAMPHETAMINE SULFATE AND AMPHETAMINE SULFATE 2.5; 2.5; 2.5; 2.5 MG/1; MG/1; MG/1; MG/1
10 TABLET ORAL 2 TIMES DAILY
Qty: 60 TAB | Refills: 0 | Status: SHIPPED | OUTPATIENT
Start: 2020-02-25 | End: 2020-03-16 | Stop reason: SDUPTHER

## 2019-12-23 RX ORDER — ZOLPIDEM TARTRATE 10 MG/1
10 TABLET ORAL NIGHTLY PRN
Qty: 30 TAB | Refills: 5 | Status: SHIPPED
Start: 2019-12-23 | End: 2020-01-22

## 2019-12-23 RX ORDER — DEXTROAMPHETAMINE SACCHARATE, AMPHETAMINE ASPARTATE, DEXTROAMPHETAMINE SULFATE AND AMPHETAMINE SULFATE 2.5; 2.5; 2.5; 2.5 MG/1; MG/1; MG/1; MG/1
10 TABLET ORAL 2 TIMES DAILY
Qty: 60 TAB | Refills: 0 | Status: SHIPPED | OUTPATIENT
Start: 2019-12-27 | End: 2019-12-23 | Stop reason: SDUPTHER

## 2019-12-23 RX ORDER — DEXTROAMPHETAMINE SACCHARATE, AMPHETAMINE ASPARTATE, DEXTROAMPHETAMINE SULFATE AND AMPHETAMINE SULFATE 2.5; 2.5; 2.5; 2.5 MG/1; MG/1; MG/1; MG/1
10 TABLET ORAL 2 TIMES DAILY
Qty: 60 TAB | Refills: 0 | Status: SHIPPED | OUTPATIENT
Start: 2020-01-26 | End: 2019-12-23 | Stop reason: SDUPTHER

## 2019-12-23 NOTE — ASSESSMENT & PLAN NOTE
Chronic problem for the patient that is ongoing.  The patient was previously prescribed atorvastatin for this issue, but states that he has decided to not take the medication and work on this problem with healthy diet and lifestyle changes.   3/7/2018 10:57 12/21/2018 12:03   Cholesterol,Tot 256 (H) 207 (H)   Triglycerides 193 (H) 125   HDL 15 (L) 29 (A)    (H) 153 (H)

## 2019-12-23 NOTE — ASSESSMENT & PLAN NOTE
Chronic problem for the patient that is ongoing and related to PTSD.  The patient continues to take Ambien 10 mg nightly and was taking trazodone 100 mg nightly.  The patient was getting a prescription from this provider for trazodone 50 mg tabs as well [facility].  Patient states that at his last refill, the pharmacy would not refill from 2 providers and told the patient he needed to receive the medication prescriptions from one provider.  The patient is requesting a refill on trazodone and Ambien, and is requesting to increase trazodone dose.  The patient again reports that he took more Ambien than prescribed recently due to inability to get refill on trazodone.  The patient reports that he has previously used marijuana, but has not used since being told he could not use marijuana and continue receiving controlled substance prescriptions.  The patient is due for updated urine drug screen today.

## 2019-12-25 NOTE — PROGRESS NOTES
CC:   Chief Complaint   Patient presents with   • Medication Refill     ambien, trazadone, adderall     HISTORY OF THE PRESENT ILLNESS: Patient is a 39 y.o. male. This pleasant patient is here today to request medication refills.    Health Maintenance: Completed    Insomnia  Chronic problem for the patient that is ongoing and related to PTSD.  The patient continues to take Ambien 10 mg nightly and was taking trazodone 100 mg nightly.  The patient was getting a prescription from this provider for trazodone 50 mg tabs as [facility].  Patient states that at his last refill, the pharmacy would not refill from 2 providers and told the patient he needed to receive the medication prescriptions from one provider.  The patient is requesting a refill on trazodone and Ambien, and is requesting to increase trazodone dose.  The patient again reports that he took more Ambien than prescribed recently due to inability to get refill on trazodone.  The patient reports that he has previously used marijuana, but has not used since being told he could not use marijuana and continue receiving controlled substance prescriptions.  The patient is due for updated urine drug screen today.     Dyslipidemia  Chronic problem for the patient that is ongoing.  The patient was previously prescribed atorvastatin for this issue, but states that he has decided to not take the medication and work on this problem with healthy diet and lifestyle changes.   3/7/2018 10:57 12/21/2018 12:03   Cholesterol,Tot 256 (H) 207 (H)   Triglycerides 193 (H) 125   HDL 15 (L) 29 (A)    (H) 153 (H)       Attention deficit hyperactivity disorder (ADHD), combined type  Chronic problems the patient that is ongoing.  The patient continues to take Adderall 10 mg in the morning and 10 mg around 11 AM.  Patient continues to do well with this dosing and scheduling.  Denies side effects of the medication.  Reports that he did  not tolerate controlled release in the past.  Patient is requesting refill today.    Allergies: Patient has no known allergies.    Current Outpatient Medications Ordered in Epic   Medication Sig Dispense Refill   • zolpidem (AMBIEN) 10 MG Tab Take 1 Tab by mouth at bedtime as needed for Sleep (insomnia) for up to 30 days. 30 Tab 5   • traZODone (DESYREL) 150 MG Tab Take 1 Tab by mouth at bedtime as needed for Sleep for up to 30 days. 30 Tab 5   • [START ON 2020] amphetamine-dextroamphetamine (ADDERALL) 10 MG Tab Take 1 Tab by mouth 2 Times a Day for 30 days. 60 Tab 0   • tamsulosin (FLOMAX) 0.4 MG capsule TAKE TWO CAPSULES BY MOUTH DAILY  180 Cap 0   • naproxen (NAPROSYN) 500 MG Tab Take 500 mg by mouth 2 times a day, with meals.     • anastrozole (ARIMIDEX) 1 MG Tab Take 1 mg by mouth 3 times a week.       No current Epic-ordered facility-administered medications on file.      Past Medical History:   Diagnosis Date   • ADHD    • Hyperlipidemia    • Hypogonadism male    • Insomnia    • PTSD (post-traumatic stress disorder)      Past Surgical History:   Procedure Laterality Date   • CARPAL TUNNEL RELEASE Bilateral      Social History     Tobacco Use   • Smoking status: Former Smoker     Packs/day: 0.00     Years: 2.00     Pack years: 0.00     Types: Cigarettes     Last attempt to quit: 1999     Years since quittin.5   • Smokeless tobacco: Never Used   • Tobacco comment: only smoked off and on for 2 years   Substance Use Topics   • Alcohol use: No   • Drug use: Not Currently     Types: Marijuana     Comment: reports marijuana use once every few months     Social History     Patient does not qualify to have social determinant information on file (likely too young).   Social History Narrative   • Not on file     Family History   Problem Relation Age of Onset   • Hyperlipidemia Mother    • Hypertension Mother    • Stroke Maternal Grandmother    • Diabetes Maternal Grandfather    • Stroke Maternal  "Grandfather    • Diabetes Paternal Grandmother    • Stroke Paternal Grandmother    • No Known Problems Paternal Grandfather    • No Known Problems Son    • No Known Problems Sister    • No Known Problems Sister    • No Known Problems Sister    • No Known Problems Sister      ROS:   Constitutional: Positive for insomnia.  Negative for fever, chills, unexpected weight change, night sweats, body aches, and fatigue/generalized weakness.   HEENT: Negative for headaches, sinus congestion, vision changes, hearing changes, ear pain, tinnitus, ear discharge, rhinorrhea, sneezing, sore throat, and neck pain.    Respiratory:  Negative for cough, shortness of breath, sputum production, hemoptysis, chest congestion, dyspnea, wheezing, and crackles.    Cardiovascular:  Negative for chest pain, palpitations, PASTOR, paroxsymal nocturnal dyspnea, orthopnea, and bilateral lower extremity edema.   Gastrointestinal:  Negative for heartburn, nausea, vomiting, abdominal pain, hematochezia, melena, diarrhea, constipation, and greasy/foul-smelling stools.   Musculoskeletal: Positive for chronic low back pain.  Negative for myalgias and joint pain.   Skin:  Negative for rash, sores, lumps, itching, cyanotic skin color change.   Neurological:  Negative for dizziness, tingling, tremors, focal sensory deficit, focal weakness and headaches.   Psychiatric/Behavioral: Positive for situational depression, anxiety, PTSD, ADHD, and memory loss.  Negative for suicidal/homicidal ideation.        Exam: /78 (BP Location: Left arm, Patient Position: Sitting, BP Cuff Size: Adult)   Pulse 78   Temp 36.6 °C (97.8 °F) (Temporal)   Ht 1.778 m (5' 10\")   Wt 92.5 kg (204 lb)   SpO2 95%  Body mass index is 29.27 kg/m².    General: Well nourished, well developed male in NAD, awake and conversant.  Eyes: Normal conjunctiva, anicteric.  Round symmetrical pupils.  ENT: Hearing grossly intact.  No nasal discharge.  Neck: Neck is supple.  No masses or " thyromegaly.  CV: No lower extremity edema.  Respiratory: Respirations are nonlabored.  No wheezing.  Abdomen: Non-Distended.  Skin: Warm.  No rashes or ulcers.  MSK: Normal ambulation.  No clubbing or cyanosis.  Neuro: Sensation and CN II-XII grossly normal.  Psych: Alert and oriented.  Cooperative, appropriate mood and affect, normal judgment.    Assessment/Plan:  1. Attention deficit hyperactivity disorder (ADHD), combined type  Continue Adderall 10 mg twice daily.  Controlled substance treatment agreement and urine drug screen updated today.  Patient has a history of using marijuana, states that he has not used marijuana since being told that he cannot continue to receive controlled substance prescriptions.  3 months prescription provided of Adderall, return in 3 months for refill.  - Controlled Substance Treatment Agreement  - [facility] PAIN MANAGEMENT SCREEN; Future  - amphetamine-dextroamphetamine (ADDERALL) 10 MG Tab; Take 1 Tab by mouth 2 Times a Day for 30 days.  Dispense: 60 Tab; Refill: 0  - amphetamine-dextroamphetamine (ADDERALL) 10 MG Tab; Take 1 Tab by mouth 2 Times a Day for 30 days.  Dispense: 60 Tab; Refill: 0  - amphetamine-dextroamphetamine (ADDERALL) 10 MG Tab; Take 1 Tab by mouth 2 Times a Day for 30 days.  Dispense: 60 Tab; Refill: 0    2. Psychophysiological insomnia  Continue zolpidem 10 mg/day, refill for only 30 days at a time as the patient has reported 2 separate occasions of using more than prescribed of the medication when his insomnia is uncontrolled.  Patient was advised by his pharmacy to only receive a trazodone prescription from one provider, patient is requesting a refill of trazodone as well as increasing the dose, plan to increase to 150 mg nightly as needed for sleep, only 30-day prescription provided at a time due to using more than prescribed when insomnia is uncontrolled.  Controlled substance treatment agreement and UDS updated today.  Patient will return in 6 months for  refills.  - Controlled Substance Treatment Agreement  - [facility] PAIN MANAGEMENT SCREEN; Future  - zolpidem (AMBIEN) 10 MG Tab; Take 1 Tab by mouth at bedtime as needed for Sleep (insomnia) for up to 30 days.  Dispense: 30 Tab; Refill: 5  - traZODone (DESYREL) 150 MG Tab; Take 1 Tab by mouth at bedtime as needed for Sleep for up to 30 days.  Dispense: 30 Tab; Refill: 5    3. Dyslipidemia  Discussed healthy diet, exercise, weight loss.  Patient has decided to discontinue atorvastatin for this problem.  Due for updated lipid profile, previously ordered.    Educated in proper administration of medication(s) ordered today including safety, possible SE, risks, benefits, rationale and alternatives to therapy.   Supportive care, differential diagnoses, and indications for immediate follow-up discussed with patient.    Pathogenesis of diagnosis discussed including typical length and natural progression.    Instructed to return to clinic or nearest emergency department for any change in condition, further concerns, or worsening of symptoms.  Patient states understanding of the plan of care and discharge instructions.     Return in 3 months (on 3/26/2020) for Insomnia, ADHD, Medication Refill, As needed.    Please note that this dictation was created using voice recognition software. I have made every reasonable attempt to correct obvious errors, but I expect that there are errors of grammar and possibly content that I did not discover before finalizing the note.

## 2019-12-25 NOTE — ASSESSMENT & PLAN NOTE
Chronic problems the patient that is ongoing.  The patient continues to take Adderall 10 mg in the morning and 10 mg around 11 AM.  Patient continues to do well with this dosing and scheduling.  Denies side effects of the medication.  Reports that he did not tolerate controlled release in the past.  Patient is requesting refill today.

## 2019-12-27 ENCOUNTER — TELEPHONE (OUTPATIENT)
Dept: MEDICAL GROUP | Facility: PHYSICIAN GROUP | Age: 39
End: 2019-12-27

## 2019-12-27 NOTE — TELEPHONE ENCOUNTER
[facility] called stating that they received the Urine but not the Requisition form (order Form) Please fax the form Urine is on Hold.

## 2020-03-16 ENCOUNTER — OFFICE VISIT (OUTPATIENT)
Dept: MEDICAL GROUP | Facility: PHYSICIAN GROUP | Age: 40
End: 2020-03-16
Payer: OTHER GOVERNMENT

## 2020-03-16 VITALS
BODY MASS INDEX: 30.35 KG/M2 | SYSTOLIC BLOOD PRESSURE: 104 MMHG | RESPIRATION RATE: 12 BRPM | TEMPERATURE: 97.6 F | HEART RATE: 80 BPM | OXYGEN SATURATION: 93 % | HEIGHT: 70 IN | DIASTOLIC BLOOD PRESSURE: 66 MMHG | WEIGHT: 212 LBS

## 2020-03-16 DIAGNOSIS — E78.5 DYSLIPIDEMIA: ICD-10-CM

## 2020-03-16 DIAGNOSIS — F90.2 ATTENTION DEFICIT HYPERACTIVITY DISORDER (ADHD), COMBINED TYPE: ICD-10-CM

## 2020-03-16 DIAGNOSIS — E66.9 OBESITY (BMI 30-39.9): ICD-10-CM

## 2020-03-16 DIAGNOSIS — R73.02 IMPAIRED GLUCOSE TOLERANCE: ICD-10-CM

## 2020-03-16 PROBLEM — M77.11 LATERAL EPICONDYLITIS OF RIGHT ELBOW: Status: RESOLVED | Noted: 2018-12-26 | Resolved: 2020-03-16

## 2020-03-16 PROBLEM — M79.642 LEFT HAND PAIN: Status: RESOLVED | Noted: 2019-09-25 | Resolved: 2020-03-16

## 2020-03-16 PROCEDURE — 99214 OFFICE O/P EST MOD 30 MIN: CPT | Performed by: NURSE PRACTITIONER

## 2020-03-16 RX ORDER — TRAZODONE HYDROCHLORIDE 150 MG/1
TABLET ORAL
COMMUNITY
Start: 2020-03-14 | End: 2020-07-09

## 2020-03-16 RX ORDER — ZOLPIDEM TARTRATE 10 MG/1
TABLET ORAL
COMMUNITY
Start: 2020-02-19 | End: 2020-06-11 | Stop reason: SDUPTHER

## 2020-03-16 RX ORDER — MELATONIN
COMMUNITY
Start: 2019-12-31 | End: 2022-07-07

## 2020-03-16 RX ORDER — CHOLECALCIFEROL (VITAMIN D3) 125 MCG
CAPSULE ORAL
COMMUNITY
Start: 2020-03-09 | End: 2022-07-07

## 2020-03-16 RX ORDER — DEXTROAMPHETAMINE SACCHARATE, AMPHETAMINE ASPARTATE, DEXTROAMPHETAMINE SULFATE AND AMPHETAMINE SULFATE 2.5; 2.5; 2.5; 2.5 MG/1; MG/1; MG/1; MG/1
10 TABLET ORAL 2 TIMES DAILY
Qty: 60 TAB | Refills: 0 | Status: SHIPPED | OUTPATIENT
Start: 2020-03-27 | End: 2020-03-16 | Stop reason: SDUPTHER

## 2020-03-16 RX ORDER — DEXTROAMPHETAMINE SACCHARATE, AMPHETAMINE ASPARTATE, DEXTROAMPHETAMINE SULFATE AND AMPHETAMINE SULFATE 2.5; 2.5; 2.5; 2.5 MG/1; MG/1; MG/1; MG/1
10 TABLET ORAL 2 TIMES DAILY
Qty: 60 TAB | Refills: 0 | Status: SHIPPED | OUTPATIENT
Start: 2020-04-26 | End: 2020-03-16 | Stop reason: SDUPTHER

## 2020-03-16 RX ORDER — DEXTROAMPHETAMINE SACCHARATE, AMPHETAMINE ASPARTATE, DEXTROAMPHETAMINE SULFATE AND AMPHETAMINE SULFATE 2.5; 2.5; 2.5; 2.5 MG/1; MG/1; MG/1; MG/1
10 TABLET ORAL 2 TIMES DAILY
Qty: 60 TAB | Refills: 0 | Status: SHIPPED | OUTPATIENT
Start: 2020-05-26 | End: 2020-06-11 | Stop reason: SDUPTHER

## 2020-03-16 ASSESSMENT — PATIENT HEALTH QUESTIONNAIRE - PHQ9: CLINICAL INTERPRETATION OF PHQ2 SCORE: 0

## 2020-03-16 NOTE — ASSESSMENT & PLAN NOTE
"Chronic, ongoing.  Not taking any cholesterol lowering medications.  10-year cardiac risk ASCVD score: 2.23%  Reports diet is \" okay\".   He is not following a low-cholesterol diet.  He is not exercising regularly.  He is not taking ASA daily.  He denies dizziness, claudication, or chest pain.  Due for updated lab work.   3/7/2018 10:57 12/21/2018 12:03   Cholesterol,Tot 256 (H) 207 (H)   Triglycerides 193 (H) 125   HDL 15 (L) 29 (A)    (H) 153 (H)   VLDL Cholesterol Calc 39      "

## 2020-03-16 NOTE — PROGRESS NOTES
"CC:   Chief Complaint   Patient presents with   • Follow-Up     Controlled substance management, ADHD med refill     HISTORY OF THE PRESENT ILLNESS: Patient is a 40 y.o. male. This pleasant patient is here today to request medication refills.    Health Maintenance: Completed    Attention deficit hyperactivity disorder (ADHD), combined type  Chronic, ongoing and stable.  Continues Adderall 10 mg in the morning and 10 mg around 11 AM.  Continues to do well with this dosing and scheduling.  Denies any side effects of the medication.  Patient is here today for refill.     reviewed, patient was prescribed dronabinol 10 mg capsule that was filled on 2/26/2020.  Patient reports that he has a friend that lives in [place] that had \"a significant surgery\" and requested that the patient get his medical marijuana license in order to be able to send him dronabinol and other marijuana items.  Patient states that he did not get the dronabinol for himself, states that he did take 1 pill but it made his insomnia worse, made his restless leg worse, and just \"made everything worse\".  States he has no intention of doing that again.    Obtained and reviewed patient utilization report from [facility] pharmacy database on 3/16/2020 prior to writing prescription for controlled substance II, III or IV per [place] bill . Based on assessment of the report, the prescription adderall is medically necessary.     Obesity (BMI 30-39.9)  Chronic, ongoing.  Patient continues to be under increased stress and is not exercising as much as he should or eating healthy.  Vitals 6/24/2019 9/25/2019 12/23/2019 3/16/2020   WEIGHT 226 211 204 212   HEIGHT 5' 10\" 5' 10\" 5' 10\" 5' 10\"   BMI 32.43 kg/m2 30.28 kg/m2 29.27 kg/m2 30.42 kg/m2       Impaired glucose tolerance  New problem to examiner.  Noted on lab work completed in June 2018.  Has not been following a healthy diet or regular exercise.  Due for updated labs in June 2020.   6/18/2019 14:39 " "  Glycohemoglobin 5.8 (H)       Dyslipidemia  Chronic, ongoing.  Not taking any cholesterol lowering medications.  10-year cardiac risk ASCVD score: 2.23%  Reports diet is \" okay\".   He is not following a low-cholesterol diet.  He is not exercising regularly.  He is not taking ASA daily.  He denies dizziness, claudication, or chest pain.  Due for updated lab work.   3/7/2018 10:57 2018 12:03   Cholesterol,Tot 256 (H) 207 (H)   Triglycerides 193 (H) 125   HDL 15 (L) 29 (A)    (H) 153 (H)   VLDL Cholesterol Calc 39      Allergies: Patient has no known allergies.    Current Outpatient Medications Ordered in Epic   Medication Sig Dispense Refill   • vitamin D (CHOLECALCIFEROL) 1000 Unit Tab      • cyanocobalamin (VITAMIN B-12) 500 MCG Tab      • traZODone (DESYREL) 150 MG Tab      • zolpidem (AMBIEN) 10 MG Tab TAKE 1 TABLET BY MOUTH AT BEDTIME AS NEEDED FOR SLEEP (INSOMNIA) FOR UP TO 30 DAYS     • [START ON 2020] amphetamine-dextroamphetamine (ADDERALL) 10 MG Tab Take 1 Tab by mouth 2 Times a Day for 30 days. 60 Tab 0   • anastrozole (ARIMIDEX) 1 MG Tab Take 1 mg by mouth 3 times a week.     • naproxen (NAPROSYN) 500 MG Tab Take 500 mg by mouth 2 times a day, with meals.       No current Epic-ordered facility-administered medications on file.      Past Medical History:   Diagnosis Date   • ADHD    • Hyperlipidemia    • Hypogonadism male    • Insomnia    • Lateral epicondylitis of right elbow 2018   • Left hand pain 2019   • PTSD (post-traumatic stress disorder)      Past Surgical History:   Procedure Laterality Date   • CARPAL TUNNEL RELEASE Bilateral      Social History     Tobacco Use   • Smoking status: Former Smoker     Packs/day: 0.00     Years: 2.00     Pack years: 0.00     Types: Cigarettes     Last attempt to quit: 1999     Years since quittin.7   • Smokeless tobacco: Never Used   • Tobacco comment: only smoked off and on for 2 years   Substance Use Topics   • Alcohol use: " "No   • Drug use: Not Currently     Types: Marijuana     Comment: reports marijuana use once every few months     Social History     Social History Narrative   • Not on file     Family History   Problem Relation Age of Onset   • Hyperlipidemia Mother    • Hypertension Mother    • Stroke Maternal Grandmother    • Diabetes Maternal Grandfather    • Stroke Maternal Grandfather    • Diabetes Paternal Grandmother    • Stroke Paternal Grandmother    • No Known Problems Paternal Grandfather    • No Known Problems Son    • No Known Problems Sister    • No Known Problems Sister    • No Known Problems Sister    • No Known Problems Sister      ROS:    Constitutional: + Insomnia.  No Fevers, Chills  Eyes: No eye pain  ENT: No sore throat  Resp: No Shortness of breath  CV: No Chest pain  GI: No Nausea/Vomiting  MSK: + Chronic low back pain.  No weakness  Skin: No rashes  Neuro: No Headaches  Psych: + Situational depression, anxiety, PTSD, ADHD, and memory loss.  No Suicidal ideations    All remaining systems reviewed and found to be negative, except as stated above.    Exam: /66 (BP Location: Left arm, Patient Position: Sitting, BP Cuff Size: Adult)   Pulse 80   Temp 36.4 °C (97.6 °F) (Temporal)   Resp 12   Ht 1.778 m (5' 10\")   Wt 96.2 kg (212 lb)   SpO2 93%  Body mass index is 30.42 kg/m².    General: Well nourished, well developed male in NAD, awake and conversant.  Eyes: Normal conjunctiva, anicteric.  Round symmetrical pupils.  ENT: Hearing grossly intact.  No nasal discharge.  Neck: Neck is supple.  No masses or thyromegaly.  CV: No lower extremity edema.  Respiratory: Respirations are nonlabored.  No wheezing.  Abdomen: Non-Distended.  Skin: Warm.  No rashes or ulcers.  MSK: Normal ambulation.  No clubbing or cyanosis.  Neuro: Sensation and CN II-XII grossly normal.  Psych: Alert and oriented.  Cooperative, appropriate mood and affect, normal judgment.    Assessment/Plan:  1. Attention deficit hyperactivity " disorder (ADHD), combined type  Chronic, ongoing and stable.  Continue Adderall 10 mg twice daily, 3 months of prescriptions provided.  Urine drug screen and controlled substance treatment agreement up-to-date.  Discussed with the patient that illicit drug use, including marijuana even though it is recreationally legal, it is not federally legal and is not allowed to be used with current controlled substances.  Advised patient that I will not prescribe controlled substances if he is using illicit drugs or other drugs that are not prescribed to him, patient verbalizes understanding.  Return in June 2020 for further refills.  - amphetamine-dextroamphetamine (ADDERALL) 10 MG Tab; Take 1 Tab by mouth 2 Times a Day for 30 days.  Dispense: 60 Tab; Refill: 0  - amphetamine-dextroamphetamine (ADDERALL) 10 MG Tab; Take 1 Tab by mouth 2 Times a Day for 30 days.  Dispense: 60 Tab; Refill: 0  - amphetamine-dextroamphetamine (ADDERALL) 10 MG Tab; Take 1 Tab by mouth 2 Times a Day for 30 days.  Dispense: 60 Tab; Refill: 0    2. Dyslipidemia  Chronic, ongoing.  Not on medication for this issue.  Due for updated labs in June 2020.  - Comp Metabolic Panel; Future  - Lipid Profile; Future  - TSH WITH REFLEX TO FT4; Future    3. Obesity (BMI 30-39.9)  Chronic, ongoing.  Encouraged healthy diet, exercise as tolerated, weight loss.  Patient's body mass index is 30.42 kg/m². Exercise and nutrition counseling were performed at this visit.  Due for updated labs in June 2020.  - HEMOGLOBIN A1C; Future  - CBC WITH DIFFERENTIAL; Future  - Comp Metabolic Panel; Future  - Lipid Profile; Future  - TSH WITH REFLEX TO FT4; Future    4. Impaired glucose tolerance  Chronic, ongoing.  Encouraged healthy diet, exercise as tolerated, weight loss.  Due for updated labs in June 2020.  - HEMOGLOBIN A1C; Future  - Comp Metabolic Panel; Future    Educated in proper administration of medication(s) ordered today including safety, possible SE, risks, benefits,  rationale and alternatives to therapy.   Supportive care, differential diagnoses, and indications for immediate follow-up discussed with patient.    Pathogenesis of diagnosis discussed including typical length and natural progression.    Instructed to return to clinic or nearest emergency department for any change in condition, further concerns, or worsening of symptoms.  Patient states understanding of the plan of care and discharge instructions.     Return in about 3 months (around 6/11/2020) for ADHD, Medication Refill, As needed.    Please note that this dictation was created using voice recognition software. I have made every reasonable attempt to correct obvious errors, but I expect that there are errors of grammar and possibly content that I did not discover before finalizing the note.

## 2020-03-16 NOTE — ASSESSMENT & PLAN NOTE
New problem to examiner.  Noted on lab work completed in June 2018.  Has not been following a healthy diet or regular exercise.  Due for updated labs in June 2020.   6/18/2019 14:39   Glycohemoglobin 5.8 (H)

## 2020-03-16 NOTE — ASSESSMENT & PLAN NOTE
"Chronic, ongoing and stable.  Continues Adderall 10 mg in the morning and 10 mg around 11 AM.  Continues to do well with this dosing and scheduling.  Denies any side effects of the medication.  Patient is here today for refill.     reviewed, patient was prescribed dronabinol 10 mg capsule that was filled on 2/26/2020.  Patient reports that he has a friend that lives in [place] that had \"a significant surgery\" and requested that the patient get his medical marijuana license in order to be able to send him dronabinol and other marijuana items.  Patient states that he did not get the dronabinol for himself, states that he did take 1 pill but it made his insomnia worse, made his restless leg worse, and just \"made everything worse\".  States he has no intention of doing that again.    Obtained and reviewed patient utilization report from [facility] pharmacy database on 3/16/2020 prior to writing prescription for controlled substance II, III or IV per [place] bill . Based on assessment of the report, the prescription adderall is medically necessary.   "

## 2020-03-16 NOTE — ASSESSMENT & PLAN NOTE
"Chronic, ongoing.  Patient continues to be under increased stress and is not exercising as much as he should or eating healthy.  Vitals 6/24/2019 9/25/2019 12/23/2019 3/16/2020   WEIGHT 226 211 204 212   HEIGHT 5' 10\" 5' 10\" 5' 10\" 5' 10\"   BMI 32.43 kg/m2 30.28 kg/m2 29.27 kg/m2 30.42 kg/m2     "

## 2020-04-16 ENCOUNTER — TELEPHONE (OUTPATIENT)
Dept: MEDICAL GROUP | Facility: PHYSICIAN GROUP | Age: 40
End: 2020-04-16

## 2020-04-16 NOTE — TELEPHONE ENCOUNTER
1. Caller Name:Jitendra Ugalde                        Call Back Number: [phone] (home)          How would the patient prefer to be contacted with a response: Phone call OK to leave a detailed message    Jitendra Ugalde called and would like a prescription for Lovaza with the sig three caps a day as recommended by his nutritionist. Please advise.

## 2020-04-16 NOTE — TELEPHONE ENCOUNTER
Phone Number Called:Jitendra Ugalde       Call outcome: Left detailed message for patient. Informed to call back with any additional questions.    Message: Labs need to be done prior to prescription.

## 2020-04-16 NOTE — TELEPHONE ENCOUNTER
Please advise the patient to updated labs that were ordered last month. Need updated labs before sending prescription.

## 2020-05-07 ENCOUNTER — TELEPHONE (OUTPATIENT)
Dept: MEDICAL GROUP | Facility: PHYSICIAN GROUP | Age: 40
End: 2020-05-07

## 2020-05-07 NOTE — TELEPHONE ENCOUNTER
1. Caller Name:Jitendra Ugalde                        Call Back Number: [phone] (home)         How would the patient prefer to be contacted with a response: Phone call do NOT leave a detailed message    Patient would like a 90 day supply of all meds and to start using [facility] scripts as pharmacy.

## 2020-05-08 ENCOUNTER — HOSPITAL ENCOUNTER (OUTPATIENT)
Dept: LAB | Facility: MEDICAL CENTER | Age: 40
End: 2020-05-08
Attending: NURSE PRACTITIONER
Payer: OTHER GOVERNMENT

## 2020-05-08 DIAGNOSIS — E78.5 DYSLIPIDEMIA: ICD-10-CM

## 2020-05-08 DIAGNOSIS — E66.9 OBESITY (BMI 30-39.9): ICD-10-CM

## 2020-05-08 DIAGNOSIS — R73.02 IMPAIRED GLUCOSE TOLERANCE: ICD-10-CM

## 2020-05-08 LAB
ALBUMIN SERPL BCP-MCNC: 4.2 G/DL (ref 3.2–4.9)
ALBUMIN/GLOB SERPL: 1.6 G/DL
ALP SERPL-CCNC: 57 U/L (ref 30–99)
ALT SERPL-CCNC: 34 U/L (ref 2–50)
ANION GAP SERPL CALC-SCNC: 9 MMOL/L (ref 7–16)
AST SERPL-CCNC: 23 U/L (ref 12–45)
BASOPHILS # BLD AUTO: 0.7 % (ref 0–1.8)
BASOPHILS # BLD: 0.05 K/UL (ref 0–0.12)
BILIRUB SERPL-MCNC: 0.5 MG/DL (ref 0.1–1.5)
BUN SERPL-MCNC: 15 MG/DL (ref 8–22)
CALCIUM SERPL-MCNC: 9.9 MG/DL (ref 8.5–10.5)
CHLORIDE SERPL-SCNC: 99 MMOL/L (ref 96–112)
CHOLEST SERPL-MCNC: 195 MG/DL (ref 100–199)
CO2 SERPL-SCNC: 28 MMOL/L (ref 20–33)
CREAT SERPL-MCNC: 1.2 MG/DL (ref 0.5–1.4)
EOSINOPHIL # BLD AUTO: 0.1 K/UL (ref 0–0.51)
EOSINOPHIL NFR BLD: 1.5 % (ref 0–6.9)
ERYTHROCYTE [DISTWIDTH] IN BLOOD BY AUTOMATED COUNT: 52.4 FL (ref 35.9–50)
EST. AVERAGE GLUCOSE BLD GHB EST-MCNC: 114 MG/DL
FASTING STATUS PATIENT QL REPORTED: NORMAL
GLOBULIN SER CALC-MCNC: 2.7 G/DL (ref 1.9–3.5)
GLUCOSE SERPL-MCNC: 86 MG/DL (ref 65–99)
HBA1C MFR BLD: 5.6 % (ref 0–5.6)
HCT VFR BLD AUTO: 49.4 % (ref 42–52)
HDLC SERPL-MCNC: 30 MG/DL
HGB BLD-MCNC: 15.7 G/DL (ref 14–18)
IMM GRANULOCYTES # BLD AUTO: 0.04 K/UL (ref 0–0.11)
IMM GRANULOCYTES NFR BLD AUTO: 0.6 % (ref 0–0.9)
LDLC SERPL CALC-MCNC: 137 MG/DL
LYMPHOCYTES # BLD AUTO: 2.89 K/UL (ref 1–4.8)
LYMPHOCYTES NFR BLD: 42.8 % (ref 22–41)
MCH RBC QN AUTO: 27.9 PG (ref 27–33)
MCHC RBC AUTO-ENTMCNC: 31.8 G/DL (ref 33.7–35.3)
MCV RBC AUTO: 87.7 FL (ref 81.4–97.8)
MONOCYTES # BLD AUTO: 0.69 K/UL (ref 0–0.85)
MONOCYTES NFR BLD AUTO: 10.2 % (ref 0–13.4)
NEUTROPHILS # BLD AUTO: 2.98 K/UL (ref 1.82–7.42)
NEUTROPHILS NFR BLD: 44.2 % (ref 44–72)
NRBC # BLD AUTO: 0 K/UL
NRBC BLD-RTO: 0 /100 WBC
PLATELET # BLD AUTO: 226 K/UL (ref 164–446)
PMV BLD AUTO: 11.8 FL (ref 9–12.9)
POTASSIUM SERPL-SCNC: 3.9 MMOL/L (ref 3.6–5.5)
PROT SERPL-MCNC: 6.9 G/DL (ref 6–8.2)
RBC # BLD AUTO: 5.63 M/UL (ref 4.7–6.1)
SODIUM SERPL-SCNC: 136 MMOL/L (ref 135–145)
TRIGL SERPL-MCNC: 139 MG/DL (ref 0–149)
TSH SERPL DL<=0.005 MIU/L-ACNC: 3.22 UIU/ML (ref 0.38–5.33)
WBC # BLD AUTO: 6.8 K/UL (ref 4.8–10.8)

## 2020-05-08 PROCEDURE — 36415 COLL VENOUS BLD VENIPUNCTURE: CPT

## 2020-05-08 PROCEDURE — 80061 LIPID PANEL: CPT

## 2020-05-08 PROCEDURE — 83036 HEMOGLOBIN GLYCOSYLATED A1C: CPT

## 2020-05-08 PROCEDURE — 85025 COMPLETE CBC W/AUTO DIFF WBC: CPT

## 2020-05-08 PROCEDURE — 80053 COMPREHEN METABOLIC PANEL: CPT

## 2020-05-08 PROCEDURE — 84443 ASSAY THYROID STIM HORMONE: CPT

## 2020-06-11 ENCOUNTER — OFFICE VISIT (OUTPATIENT)
Dept: MEDICAL GROUP | Facility: PHYSICIAN GROUP | Age: 40
End: 2020-06-11
Payer: OTHER GOVERNMENT

## 2020-06-11 VITALS
HEIGHT: 69 IN | TEMPERATURE: 98.1 F | SYSTOLIC BLOOD PRESSURE: 130 MMHG | OXYGEN SATURATION: 94 % | BODY MASS INDEX: 32.58 KG/M2 | HEART RATE: 77 BPM | RESPIRATION RATE: 18 BRPM | DIASTOLIC BLOOD PRESSURE: 74 MMHG | WEIGHT: 220 LBS

## 2020-06-11 DIAGNOSIS — F90.2 ATTENTION DEFICIT HYPERACTIVITY DISORDER (ADHD), COMBINED TYPE: ICD-10-CM

## 2020-06-11 DIAGNOSIS — E78.5 DYSLIPIDEMIA: ICD-10-CM

## 2020-06-11 DIAGNOSIS — F51.04 PSYCHOPHYSIOLOGICAL INSOMNIA: ICD-10-CM

## 2020-06-11 DIAGNOSIS — E66.9 OBESITY (BMI 30-39.9): ICD-10-CM

## 2020-06-11 PROCEDURE — 99214 OFFICE O/P EST MOD 30 MIN: CPT | Performed by: NURSE PRACTITIONER

## 2020-06-11 RX ORDER — ZOLPIDEM TARTRATE 10 MG/1
10 TABLET ORAL NIGHTLY PRN
Qty: 30 TAB | Refills: 5 | Status: CANCELLED | OUTPATIENT
Start: 2020-06-11 | End: 2020-07-11

## 2020-06-11 RX ORDER — ZOLPIDEM TARTRATE 10 MG/1
10 TABLET ORAL NIGHTLY PRN
Qty: 30 TAB | Refills: 5 | Status: SHIPPED | OUTPATIENT
Start: 2020-06-13 | End: 2020-09-14 | Stop reason: SDUPTHER

## 2020-06-11 RX ORDER — FAMOTIDINE 20 MG/1
TABLET, FILM COATED ORAL
COMMUNITY
Start: 2020-06-10 | End: 2022-05-17

## 2020-06-11 RX ORDER — TESTOSTERONE CYPIONATE 200 MG/ML
INJECTION, SOLUTION INTRAMUSCULAR
COMMUNITY
Start: 2020-06-02 | End: 2022-07-07

## 2020-06-11 RX ORDER — DEXTROAMPHETAMINE SACCHARATE, AMPHETAMINE ASPARTATE, DEXTROAMPHETAMINE SULFATE AND AMPHETAMINE SULFATE 2.5; 2.5; 2.5; 2.5 MG/1; MG/1; MG/1; MG/1
10 TABLET ORAL 2 TIMES DAILY
Qty: 60 TAB | Refills: 0 | Status: SHIPPED | OUTPATIENT
Start: 2020-08-22 | End: 2020-09-14 | Stop reason: SDUPTHER

## 2020-06-11 RX ORDER — DEXTROAMPHETAMINE SACCHARATE, AMPHETAMINE ASPARTATE, DEXTROAMPHETAMINE SULFATE AND AMPHETAMINE SULFATE 2.5; 2.5; 2.5; 2.5 MG/1; MG/1; MG/1; MG/1
10 TABLET ORAL 2 TIMES DAILY
Qty: 60 TAB | Refills: 0 | Status: SHIPPED | OUTPATIENT
Start: 2020-06-23 | End: 2020-07-23

## 2020-06-11 RX ORDER — DEXTROAMPHETAMINE SACCHARATE, AMPHETAMINE ASPARTATE, DEXTROAMPHETAMINE SULFATE AND AMPHETAMINE SULFATE 2.5; 2.5; 2.5; 2.5 MG/1; MG/1; MG/1; MG/1
10 TABLET ORAL 2 TIMES DAILY
Qty: 60 TAB | Refills: 0 | Status: SHIPPED | OUTPATIENT
Start: 2020-07-23 | End: 2020-08-22

## 2020-06-11 RX ORDER — OMEGA-3-ACID ETHYL ESTERS 1 G/1
1 CAPSULE, LIQUID FILLED ORAL 2 TIMES DAILY
Qty: 180 CAP | Refills: 0 | Status: SHIPPED | OUTPATIENT
Start: 2020-06-11 | End: 2022-07-07

## 2020-06-11 RX ORDER — DEXTROAMPHETAMINE SACCHARATE, AMPHETAMINE ASPARTATE, DEXTROAMPHETAMINE SULFATE AND AMPHETAMINE SULFATE 2.5; 2.5; 2.5; 2.5 MG/1; MG/1; MG/1; MG/1
10 TABLET ORAL 2 TIMES DAILY
Qty: 60 TAB | Refills: 0 | Status: CANCELLED | OUTPATIENT
Start: 2020-06-11 | End: 2020-07-11

## 2020-06-11 ASSESSMENT — FIBROSIS 4 INDEX: FIB4 SCORE: 0.73

## 2020-06-11 NOTE — ASSESSMENT & PLAN NOTE
"Chronic, ongoing. Reports that Dr. Jean Phillips, patients after that manages anastrozole and testosterone replacement, recommended that the patient ask for a prescription for Lovaza.  Not taking any cholesterol lowering medications.  Reports diet is \"okay\".   He is not following a low-cholesterol diet.  He is not exercising regularly.  Plans to return to the gyms now that they have reopened.  He is not taking ASA daily.  He denies dizziness, claudication, or chest pain.  Due for updated lab work in May 2021.   3/7/2018 10:57 12/21/2018 12:03 5/8/2020 09:17   Cholesterol,Tot 256 (H) 207 (H) 195   Triglycerides 193 (H) 125 139   HDL 15 (L) 29 (A) 30 (A)    (H) 153 (H) 137 (H)     "

## 2020-06-11 NOTE — ASSESSMENT & PLAN NOTE
Chronic, ongoing. Continues Adderall 10 mg in the morning and 10 mg around 11 AM.  Patient continues to do well with this dosing and scheduling.  Denies side effects of the medication.  Reports that he did not tolerate controlled release in the past.  Patient is requesting refill today.  UDS: 12/23/2019    Controlled Substance Treatment Agreement: 12/23/2019    Obtained and reviewed the patient utilization report state pharmacy database on 6/11/2020. Based on assessment of report prescription for adderall and ambien is medically necessary. Patient has not requested any early refills and exhibits no abberant behavior. I have advised patient to keep medication and safe place and to not drive, use alcohol, or take illicit drugs while taking this medication.

## 2020-06-11 NOTE — ASSESSMENT & PLAN NOTE
"Chronic, ongoing.  Patient has had an 8 pound weight gain in the last 3 months.  Has not been as physically active due to COVID-19 and gyms close.  States that he did go on a hike yesterday at [facility] and is planning on returning to the gyms now that they are open.  Vitals 12/23/2019 3/16/2020 6/11/2020   WEIGHT 204 212 220   HEIGHT 5' 10\" 5' 10\" 5' 9\"   BMI 29.27 kg/m2 30.42 kg/m2 32.49 kg/m2     "

## 2020-06-11 NOTE — PROGRESS NOTES
"CC:   Chief Complaint   Patient presents with   • Medication Refill     Ambien, adderall     HISTORY OF THE PRESENT ILLNESS: Patient is a 40 y.o. male. This pleasant patient is here today to request medication refills.    Health Maintenance: Completed    Obesity (BMI 30-39.9)  Chronic, ongoing.  Patient has had an 8 pound weight gain in the last 3 months.  Has not been as physically active due to COVID-19 and gyms close.  States that he did go on a hike yesterday at [facility] and is planning on returning to the gyms now that they are open.  Vitals 12/23/2019 3/16/2020 6/11/2020   WEIGHT 204 212 220   HEIGHT 5' 10\" 5' 10\" 5' 9\"   BMI 29.27 kg/m2 30.42 kg/m2 32.49 kg/m2       Dyslipidemia  Chronic, ongoing. Reports that Dr. Jean Phillips, patients after that manages anastrozole and testosterone replacement, recommended that the patient ask for a prescription for Lovaza.  Not taking any cholesterol lowering medications.  Reports diet is \"okay\".   He is not following a low-cholesterol diet.  He is not exercising regularly.  Plans to return to the gyms now that they have reopened.  He is not taking ASA daily.  He denies dizziness, claudication, or chest pain.  Due for updated lab work in May 2021.   3/7/2018 10:57 12/21/2018 12:03 5/8/2020 09:17   Cholesterol,Tot 256 (H) 207 (H) 195   Triglycerides 193 (H) 125 139   HDL 15 (L) 29 (A) 30 (A)    (H) 153 (H) 137 (H)       Insomnia  Chronic, ongoing and related to PTSD. Continues to take Ambien 10 mg nightly and taking trazodone 150 mg nightly. The patient reports that he has previously used marijuana, but has not used since being told he could not use marijuana and continue receiving controlled substance prescriptions.  Presents today for refill of Ambien.    UDS: 12/23/2019    Controlled Substance Treatment Agreement: 12/23/2019    Obtained and reviewed the patient utilization report state pharmacy database on 6/11/2020. Based on assessment of " report prescription for adderall and ambien is medically necessary. Patient has not requested any early refills and exhibits no abberant behavior. I have advised patient to keep medication and safe place and to not drive, use alcohol, or take illicit drugs while taking this medication.    Attention deficit hyperactivity disorder (ADHD), combined type  Chronic, ongoing. Continues Adderall 10 mg in the morning and 10 mg around 11 AM.  Patient continues to do well with this dosing and scheduling.  Denies side effects of the medication.  Reports that he did not tolerate controlled release in the past.  Patient is requesting refill today.  UDS: 12/23/2019    Controlled Substance Treatment Agreement: 12/23/2019    Obtained and reviewed the patient utilization report state pharmacy database on 6/11/2020. Based on assessment of report prescription for adderall and ambien is medically necessary. Patient has not requested any early refills and exhibits no abberant behavior. I have advised patient to keep medication and safe place and to not drive, use alcohol, or take illicit drugs while taking this medication.      Allergies: Patient has no known allergies.    Current Outpatient Medications Ordered in Epic   Medication Sig Dispense Refill   • famotidine (PEPCID) 20 MG Tab      • testosterone cypionate (DEPO-TESTOSTERONE) 200 MG/ML Solution injection INJECT 0.5 MLS INTRAMUSCULARLY WEEKLY     • omega-3 acid ethyl esters (LOVAZA) 1 GM capsule Take 1 Cap by mouth 2 Times a Day. 180 Cap 0   • [START ON 6/13/2020] zolpidem (AMBIEN) 10 MG Tab Take 1 Tab by mouth at bedtime as needed for Sleep for up to 30 days. 30 Tab 5   • [START ON 6/23/2020] amphetamine-dextroamphetamine (ADDERALL) 10 MG Tab Take 1 Tab by mouth 2 Times a Day for 30 days. 60 Tab 0   • [START ON 7/23/2020] amphetamine-dextroamphetamine (ADDERALL) 10 MG Tab Take 1 Tab by mouth 2 times a day for 30 days. 60 Tab 0   • [START ON 8/22/2020]  amphetamine-dextroamphetamine (ADDERALL) 10 MG Tab Take 1 Tab by mouth 2 times a day for 30 days. 60 Tab 0   • vitamin D (CHOLECALCIFEROL) 1000 Unit Tab      • traZODone (DESYREL) 150 MG Tab      • anastrozole (ARIMIDEX) 1 MG Tab Take 1 mg by mouth 3 times a week.     • naproxen (NAPROSYN) 500 MG Tab Take 500 mg by mouth 2 times a day, with meals.     • cyanocobalamin (VITAMIN B-12) 500 MCG Tab        No current Epic-ordered facility-administered medications on file.      Past Medical History:   Diagnosis Date   • ADHD    • Hyperlipidemia    • Hypogonadism male    • Insomnia    • Lateral epicondylitis of right elbow 2018   • Left hand pain 2019   • PTSD (post-traumatic stress disorder)      Past Surgical History:   Procedure Laterality Date   • CARPAL TUNNEL RELEASE Bilateral      Social History     Tobacco Use   • Smoking status: Former Smoker     Packs/day: 0.00     Years: 2.00     Pack years: 0.00     Types: Cigarettes     Last attempt to quit: 1999     Years since quittin.9   • Smokeless tobacco: Never Used   • Tobacco comment: only smoked off and on for 2 years   Substance Use Topics   • Alcohol use: Not Currently   • Drug use: Not Currently     Types: Marijuana     Comment: reports marijuana use once every few months     Social History     Social History Narrative   • Not on file     Family History   Problem Relation Age of Onset   • Hyperlipidemia Mother    • Hypertension Mother    • Stroke Maternal Grandmother    • Diabetes Maternal Grandfather    • Stroke Maternal Grandfather    • Diabetes Paternal Grandmother    • Stroke Paternal Grandmother    • No Known Problems Paternal Grandfather    • No Known Problems Son    • No Known Problems Sister    • No Known Problems Sister    • No Known Problems Sister    • No Known Problems Sister      ROS:    Constitutional: + Insomnia.  No Fevers, Chills  Eyes: No eye pain  ENT: No sore throat  Resp: No Shortness of breath  CV: No Chest pain  GI: No  "Nausea/Vomiting  MSK: + Chronic low back pain.  No weakness  Skin: No rashes  Neuro: No Headaches  Psych: + Situational depression, anxiety, PTSD, ADHD, and memory loss.  No Suicidal ideations    All remaining systems reviewed and found to be negative, except as stated above.    Exam: /74 (BP Location: Left arm, Patient Position: Sitting, BP Cuff Size: Large adult)   Pulse 77   Temp 36.7 °C (98.1 °F) (Temporal)   Resp 18   Ht 1.753 m (5' 9\")   Wt 99.8 kg (220 lb)   SpO2 94%  Body mass index is 32.49 kg/m².    General: Well nourished, well developed male in NAD, awake and conversant.  Eyes: Normal conjunctiva, anicteric.  Round symmetrical pupils.  ENT: Hearing grossly intact.  No nasal discharge.  Neck: Neck is supple.  No masses or thyromegaly.  CV: No lower extremity edema.  Respiratory: Respirations are nonlabored.  No wheezing.  Abdomen: Non-Distended.  Skin: Warm.  No rashes or ulcers.  MSK: Normal ambulation.  No clubbing or cyanosis.  Neuro: Sensation and CN II-XII grossly normal.  Psych: Alert and oriented.  Cooperative, appropriate mood and affect, normal judgment.    Assessment/Plan:  1. Attention deficit hyperactivity disorder (ADHD), combined type  Chronic, ongoing.  Continue Adderall 10 mg twice daily, 3 months of refills provided.  Controlled substance treatment agreement and urine drug screen up-to-date.  - amphetamine-dextroamphetamine (ADDERALL) 10 MG Tab; Take 1 Tab by mouth 2 Times a Day for 30 days.  Dispense: 60 Tab; Refill: 0  - amphetamine-dextroamphetamine (ADDERALL) 10 MG Tab; Take 1 Tab by mouth 2 times a day for 30 days.  Dispense: 60 Tab; Refill: 0  - amphetamine-dextroamphetamine (ADDERALL) 10 MG Tab; Take 1 Tab by mouth 2 times a day for 30 days.  Dispense: 60 Tab; Refill: 0    2. Psychophysiological insomnia  Chronic, ongoing.  Continue Ambien 10 mg at night as needed.  30-day refill with 5 refills provided.  Continue trazodone 150 mg per night, does not need a refill at " this time.  - zolpidem (AMBIEN) 10 MG Tab; Take 1 Tab by mouth at bedtime as needed for Sleep for up to 30 days.  Dispense: 30 Tab; Refill: 5    3. Obesity (BMI 30-39.9)  Chronic, ongoing.  Encouraged diet high in fruits, vegetables, and fiber. And a diet low in salt, refined carbohydrates, cholesterol, saturated fat, and trans fatty acids.    Encouraged a minimum of 30 minutes of moderate intensity aerobic exercise (eg, brisk walking) is recommended on five days each week. Or 30 minutes of vigorous-intensity aerobic exercise (eg, jogging) on three days each week.   Patient's body mass index is 32.49 kg/m². Exercise and nutrition counseling were performed at this visit.    4. Dyslipidemia  Chronic, ongoing.  Patient was recommended to request a prescription for Lovaza by Dr. Urena, who manages anastrozole and testosterone replacement.  Due to repeat lipid profile in May 2021.  - omega-3 acid ethyl esters (LOVAZA) 1 GM capsule; Take 1 Cap by mouth 2 Times a Day.  Dispense: 180 Cap; Refill: 0    Educated in proper administration of medication(s) ordered today including safety, possible SE, risks, benefits, rationale and alternatives to therapy.   Supportive care, differential diagnoses, and indications for immediate follow-up discussed with patient.    Pathogenesis of diagnosis discussed including typical length and natural progression.    Instructed to return to clinic or nearest emergency department for any change in condition, further concerns, or worsening of symptoms.  Patient states understanding of the plan of care and discharge instructions.     Return in about 3 months (around 9/11/2020) for ADD/ADHD, Medication Refill, As needed.    Please note that this dictation was created using voice recognition software. I have made every reasonable attempt to correct obvious errors, but I expect that there are errors of grammar and possibly content that I did not discover before finalizing the note.

## 2020-06-11 NOTE — ASSESSMENT & PLAN NOTE
Chronic, ongoing and related to PTSD. Continues to take Ambien 10 mg nightly and taking trazodone 150 mg nightly. The patient reports that he has previously used marijuana, but has not used since being told he could not use marijuana and continue receiving controlled substance prescriptions.  Presents today for refill of Ambien.    UDS: 12/23/2019    Controlled Substance Treatment Agreement: 12/23/2019    Obtained and reviewed the patient utilization report state pharmacy database on 6/11/2020. Based on assessment of report prescription for adderall and ambien is medically necessary. Patient has not requested any early refills and exhibits no abberant behavior. I have advised patient to keep medication and safe place and to not drive, use alcohol, or take illicit drugs while taking this medication.

## 2020-07-09 RX ORDER — TRAZODONE HYDROCHLORIDE 150 MG/1
150 TABLET ORAL
Qty: 30 TAB | Refills: 5 | Status: SHIPPED | OUTPATIENT
Start: 2020-07-09 | End: 2020-12-15 | Stop reason: SDUPTHER

## 2020-07-09 NOTE — TELEPHONE ENCOUNTER
Received request via: Pharmacy    Was the patient seen in the last year in this department? Yes  6/11/20    Does the patient have an active prescription (recently filled or refills available) for medication(s) requested? No

## 2020-07-09 NOTE — TELEPHONE ENCOUNTER
Requested Prescriptions     Pending Prescriptions Disp Refills   • traZODone (DESYREL) 150 MG Tab [Pharmacy Med Name: traZODone HCl 150 MG Oral Tablet] 30 Tab 5     Sig: Take 1 Tab by mouth at bedtime as needed for Sleep.       VERO Turcios.

## 2020-09-01 DIAGNOSIS — E29.1 HYPOGONADISM MALE: ICD-10-CM

## 2020-09-01 NOTE — TELEPHONE ENCOUNTER
Received request via: Pharmacy    Was the patient seen in the last year in this department? Yes 6/11/20    Does the patient have an active prescription (recently filled or refills available) for medication(s) requested? No  
No

## 2020-09-02 RX ORDER — TAMSULOSIN HYDROCHLORIDE 0.4 MG/1
CAPSULE ORAL
Qty: 180 CAP | Refills: 0 | OUTPATIENT
Start: 2020-09-02

## 2020-09-14 ENCOUNTER — OFFICE VISIT (OUTPATIENT)
Dept: MEDICAL GROUP | Facility: PHYSICIAN GROUP | Age: 40
End: 2020-09-14
Payer: OTHER GOVERNMENT

## 2020-09-14 VITALS
DIASTOLIC BLOOD PRESSURE: 64 MMHG | BODY MASS INDEX: 31.5 KG/M2 | HEIGHT: 70 IN | HEART RATE: 109 BPM | SYSTOLIC BLOOD PRESSURE: 128 MMHG | TEMPERATURE: 99.7 F | OXYGEN SATURATION: 94 % | WEIGHT: 220 LBS | RESPIRATION RATE: 16 BRPM

## 2020-09-14 DIAGNOSIS — F51.04 PSYCHOPHYSIOLOGICAL INSOMNIA: ICD-10-CM

## 2020-09-14 DIAGNOSIS — R35.1 NOCTURIA: ICD-10-CM

## 2020-09-14 DIAGNOSIS — F90.2 ATTENTION DEFICIT HYPERACTIVITY DISORDER (ADHD), COMBINED TYPE: ICD-10-CM

## 2020-09-14 DIAGNOSIS — G47.33 OSA ON CPAP: ICD-10-CM

## 2020-09-14 PROCEDURE — 99215 OFFICE O/P EST HI 40 MIN: CPT | Performed by: NURSE PRACTITIONER

## 2020-09-14 RX ORDER — ZOLPIDEM TARTRATE 10 MG/1
10 TABLET ORAL NIGHTLY PRN
Qty: 30 TAB | Refills: 5 | Status: CANCELLED | OUTPATIENT
Start: 2020-10-06 | End: 2020-11-05

## 2020-09-14 RX ORDER — ZOLPIDEM TARTRATE 10 MG/1
10 TABLET ORAL NIGHTLY PRN
Qty: 30 TAB | Refills: 5 | Status: SHIPPED | OUTPATIENT
Start: 2020-10-06 | End: 2020-12-15 | Stop reason: SDUPTHER

## 2020-09-14 RX ORDER — DEXTROAMPHETAMINE SACCHARATE, AMPHETAMINE ASPARTATE, DEXTROAMPHETAMINE SULFATE AND AMPHETAMINE SULFATE 2.5; 2.5; 2.5; 2.5 MG/1; MG/1; MG/1; MG/1
10 TABLET ORAL 2 TIMES DAILY
Qty: 60 TAB | Refills: 0 | Status: SHIPPED | OUTPATIENT
Start: 2020-11-20 | End: 2020-12-15 | Stop reason: SDUPTHER

## 2020-09-14 RX ORDER — DEXTROAMPHETAMINE SACCHARATE, AMPHETAMINE ASPARTATE, DEXTROAMPHETAMINE SULFATE AND AMPHETAMINE SULFATE 2.5; 2.5; 2.5; 2.5 MG/1; MG/1; MG/1; MG/1
10 TABLET ORAL 2 TIMES DAILY
Qty: 60 TAB | Refills: 0 | Status: SHIPPED | OUTPATIENT
Start: 2020-09-21 | End: 2020-10-21

## 2020-09-14 RX ORDER — DEXTROAMPHETAMINE SACCHARATE, AMPHETAMINE ASPARTATE, DEXTROAMPHETAMINE SULFATE AND AMPHETAMINE SULFATE 2.5; 2.5; 2.5; 2.5 MG/1; MG/1; MG/1; MG/1
10 TABLET ORAL 2 TIMES DAILY
Qty: 60 TAB | Refills: 0 | Status: SHIPPED | OUTPATIENT
Start: 2020-10-21 | End: 2020-11-20

## 2020-09-14 RX ORDER — DEXTROAMPHETAMINE SACCHARATE, AMPHETAMINE ASPARTATE, DEXTROAMPHETAMINE SULFATE AND AMPHETAMINE SULFATE 2.5; 2.5; 2.5; 2.5 MG/1; MG/1; MG/1; MG/1
10 TABLET ORAL 2 TIMES DAILY
Qty: 60 TAB | Refills: 0 | Status: CANCELLED | OUTPATIENT
Start: 2020-09-21 | End: 2020-10-21

## 2020-09-14 ASSESSMENT — FIBROSIS 4 INDEX: FIB4 SCORE: 0.73

## 2020-09-14 NOTE — PATIENT INSTRUCTIONS
Only use Ambien AS NEEDED  Do not exceed 20 mg of melatonin per day  Stay on schedule with adderall -7 am and 11 am  [facility]. Use CPAP nightly.

## 2020-09-14 NOTE — ASSESSMENT & PLAN NOTE
Chronic, ongoing. Continues Adderall 10 mg in the morning around 7am and 10 mg around 11 AM. Patient continues to do well with this dosing and scheduling.  Denies side effects of the medication.  Reports that he did not tolerate controlled release in the past.  Patient is requesting refill today.    UDS: 12/23/2019    Controlled Substance Treatment Agreement: 12/23/2019    Obtained and reviewed the patient utilization report state pharmacy database on 9/14/2020. Based on assessment of report prescription for adderall and ambien is medically necessary. Patient has not requested any early refills and exhibits no abberant behavior. I have advised patient to keep medication and safe place and to not drive, use alcohol, or take illicit drugs while taking this medication.

## 2020-09-14 NOTE — ASSESSMENT & PLAN NOTE
Chronic, ongoing and related to PTSD. Continues to take Ambien 10 mg nightly and taking trazodone 150 mg nightly.  Also states that he is taking over-the-counter melatonin ranging from 30-50 mg nightly.  He is also using herbal teas to help with this issue.  The patient reports that he has previously used marijuana, but has not used since being told he could not use marijuana and continue receiving controlled substance prescriptions. Also notes that he has had a sleep study many years ago and was told that he has severe obstructive sleep apnea.  He is prescribed a CPAP, states that he does not use it because he needs to spend $400 to get the cleaning kit.  Does not follow with pulmonary or sleep medicine regularly.  Continues to report daytime fatigue.  Presents today for refill of Ambien.    UDS: 12/23/2019    Controlled Substance Treatment Agreement: 12/23/2019    Obtained and reviewed the patient utilization report state pharmacy database on 9/14/2020. Based on assessment of report prescription for adderall and ambien is medically necessary. Patient has not requested any early refills and exhibits no abberant behavior. I have advised patient to keep medication and safe place and to not drive, use alcohol, or take illicit drugs while taking this medication.

## 2020-09-14 NOTE — ASSESSMENT & PLAN NOTE
"New problem to examiner.  Patient reports that he continues to have urinary hesitancy, weak stream, and nocturia getting up 3-4 times per night.  Reports that he does drink tea up until about 8:30 PM and then goes to sleep around 11 PM.  He has been prescribed tamsulosin in the past, which he says helps with his insomnia.  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  In the past for this, he has been prescribed \"Caverject injections\", \"quad mix injections\", and Levitra to be used as needed.  Reports that he has not used any penile injections in about 6 years.  States that he is not currently in a relationship, last long-term relationship ended in 2017 so he has not been taking the Levitra, but [facility].  "

## 2020-09-15 NOTE — PROGRESS NOTES
CC:   Chief Complaint   Patient presents with   • Medication Refill     ADD/ADHD/tamosulin/pepcid   • Requesting Labs     Immune     HISTORY OF THE PRESENT ILLNESS: Patient is a 40 y.o. male. This pleasant patient is here today to request medication refills.    Health Maintenance: Completed    Insomnia  Chronic, ongoing and related to PTSD. Continues to take Ambien 10 mg nightly and taking trazodone 150 mg nightly.  Also states that he is taking over-the-counter melatonin ranging from 30-50 mg nightly.  He is also using herbal teas to help with this issue.  The patient reports that he has previously used marijuana, but has not used since being told he could not use marijuana and continue receiving controlled substance prescriptions. Also notes that he has had a sleep study many years ago and was told that he has severe obstructive sleep apnea.  He is prescribed a CPAP, states that he does not use it because he needs to spend $400 to get the cleaning kit.  Does not follow with pulmonary or sleep medicine regularly.  Continues to report daytime fatigue.  Presents today for refill of Ambien.    UDS: 12/23/2019    Controlled Substance Treatment Agreement: 12/23/2019    Obtained and reviewed the patient utilization report state pharmacy database on 9/14/2020. Based on assessment of report prescription for adderall and ambien is medically necessary. Patient has not requested any early refills and exhibits no abberant behavior. I have advised patient to keep medication and safe place and to not drive, use alcohol, or take illicit drugs while taking this medication.    Attention deficit hyperactivity disorder (ADHD), combined type  Chronic, ongoing. Continues Adderall 10 mg in the morning around 7am and 10 mg around 11 AM. Patient continues to do well with this dosing and scheduling.  Denies side effects of the medication.  Reports that he did not tolerate controlled release in the past.  Patient is requesting refill  "today.    UDS: 12/23/2019    Controlled Substance Treatment Agreement: 12/23/2019    Obtained and reviewed the patient utilization report state pharmacy database on 9/14/2020. Based on assessment of report prescription for adderall and ambien is medically necessary. Patient has not requested any early refills and exhibits no abberant behavior. I have advised patient to keep medication and safe place and to not drive, use alcohol, or take illicit drugs while taking this medication.    Nocturia  New problem to examiner.  Patient reports that he continues to have urinary hesitancy, weak stream, and nocturia getting up 3-4 times per night.  Reports that he does drink tea up until about 8:30 PM and then goes to sleep around 11 PM.  He has been prescribed tamsulosin in the past, which he says helps with his insomnia.  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  In the past for this, he has been prescribed \"Caverject injections\", \"quad mix injections\", and Levitra to be used as needed.  Reports that he has not used any penile injections in about 6 years.  States that he is not currently in a relationship, last long-term relationship ended in 2017 so he has not been taking the Levitra, but [facility]    CHARO on CPAP  New problem to examiner.  Chronic problem for the patient that is uncontrolled.  Reports that he had a sleep study many years ago and was told that he has severe obstructive sleep apnea.  He is prescribed a CPAP machine, states that he does not use the machine because he does not want to spend $400 on the cleaning supplies.  States that he did get an upper respiratory infection from an un-sanitize machine in the past so he is hesitant.  Also reports that \"the mask is uncomfortable and I sleep on my side\".  He does report chronic daytime fatigue and insomnia.  Has not followed up with pulmonary or sleep medicine, was last seen by " [facility].    Allergies: Patient has no known allergies.    Current Outpatient Medications Ordered in Epic   Medication Sig Dispense Refill   • [START ON 9/21/2020] amphetamine-dextroamphetamine (ADDERALL) 10 MG Tab Take 1 Tab by mouth 2 times a day for 30 days. 60 Tab 0   • [START ON 10/6/2020] zolpidem (AMBIEN) 10 MG Tab Take 1 Tab by mouth at bedtime as needed for Sleep for up to 30 days. 30 Tab 5   • [START ON 10/21/2020] amphetamine-dextroamphetamine (ADDERALL) 10 MG Tab Take 1 Tab by mouth 2 times a day for 30 days. 60 Tab 0   • [START ON 11/20/2020] amphetamine-dextroamphetamine (ADDERALL) 10 MG Tab Take 1 Tab by mouth 2 times a day for 30 days. 60 Tab 0   • traZODone (DESYREL) 150 MG Tab Take 1 Tab by mouth at bedtime as needed for Sleep. 30 Tab 5   • famotidine (PEPCID) 20 MG Tab      • testosterone cypionate (DEPO-TESTOSTERONE) 200 MG/ML Solution injection INJECT 0.5 MLS INTRAMUSCULARLY WEEKLY     • omega-3 acid ethyl esters (LOVAZA) 1 GM capsule Take 1 Cap by mouth 2 Times a Day. 180 Cap 0   • vitamin D (CHOLECALCIFEROL) 1000 Unit Tab      • cyanocobalamin (VITAMIN B-12) 500 MCG Tab      • anastrozole (ARIMIDEX) 1 MG Tab Take 1 mg by mouth 3 times a week.     • naproxen (NAPROSYN) 500 MG Tab Take 500 mg by mouth 2 times a day, with meals.       No current Epic-ordered facility-administered medications on file.      Past Medical History:   Diagnosis Date   • ADHD    • ED (erectile dysfunction)    • Hyperlipidemia    • Hypogonadism male    • Insomnia    • Lateral epicondylitis of right elbow 12/26/2018   • Left hand pain 9/25/2019   • CHARO on CPAP    • PTSD (post-traumatic stress disorder)      Past Surgical History:   Procedure Laterality Date   • CARPAL TUNNEL RELEASE Bilateral      Social History     Tobacco Use   • Smoking status: Former Smoker     Packs/day: 0.00     Years: 2.00     Pack years: 0.00     Types: Cigarettes     Start date: 1/1/1997     Quit date: 6/24/1999     Years since  "quittin.2   • Smokeless tobacco: Never Used   • Tobacco comment: only smoked off and on for 2 years   Substance Use Topics   • Alcohol use: Not Currently   • Drug use: Not Currently     Types: Marijuana     Comment: Reports marijuana use once every few months     Social History     Social History Narrative   • Not on file     Family History   Problem Relation Age of Onset   • Hyperlipidemia Mother    • Hypertension Mother    • Stroke Maternal Grandmother    • Diabetes Maternal Grandfather    • Stroke Maternal Grandfather    • Diabetes Paternal Grandmother    • Stroke Paternal Grandmother    • No Known Problems Paternal Grandfather    • No Known Problems Son    • No Known Problems Sister    • No Known Problems Sister    • No Known Problems Sister    • No Known Problems Sister      ROS:    Constitutional: + Insomnia and fatigue.  No fevers, chills, malaise/fatigue.  Eyes: No eye pain.  ENT: No sore throat, congestion.   Resp: + Previously diagnosed CHARO with CPAP.  No cough, shortness of breath.  CV: No chest pain, leg swelling, palpitations.  GI: No nausea/vomiting, abdominal pain, constipation, diarrhea.  : + Weak urinary stream, nocturia, hesitancy.  No dysuria, hematuria.  MSK: No weakness.  Skin: No rashes.  Neuro: No dizziness, weakness, headaches.  Psych: + Situational depression, anxiety, PTSD, ADHD, insomnia.  No suicidal ideations.    All remaining systems reviewed and found to be negative, except as stated above.     Exam: /64 (BP Location: Left arm, Patient Position: Sitting, BP Cuff Size: Adult long)   Pulse (!) 109   Temp 37.6 °C (99.7 °F) (Temporal)   Resp 16   Ht 1.778 m (5' 10\")   Wt 99.8 kg (220 lb)   SpO2 94%  Body mass index is 31.57 kg/m².    General:  Normal appearing. No distress.  HEENT:  Normocephalic. Eyes conjunctiva clear lids without ptosis, pupils equal and reactive to light accommodation, ears normal shape and contour, canals are clear bilaterally, tympanic membranes " are benign, nasal mucosa benign, oropharynx is without erythema, edema or exudates.  Neck:  Supple without JVD. Thyroid is not enlarged.  Pulmonary:  Clear to ausculation.  Normal effort. No rales, ronchi, or wheezing.  Cardiovascular:  Regular rate and rhythm without murmur. Radial pulses are intact and equal bilaterally.  Neurologic:  Grossly nonfocal.  Lymph:  No cervical, supraclavicular lymph nodes are palpable.  Skin:  Warm and dry.  No obvious lesions.  Musculoskeletal:  Normal gait. No extremity cyanosis, clubbing, or edema.  Psych:  Normal mood and affect. Alert and oriented x3. Judgment and insight is normal.     Assessment/Plan:  1. Attention deficit hyperactivity disorder (ADHD), combined type  Chronic, ongoing.  Continue Adderall 10 mg twice daily.  Discussed the importance of taking medication on a schedule to avoid sleep disturbances.  3 months of medication refills E prescribed to pharmacy.  Controlled substance treatment agreement and urine drug screen up-to-date, due in December 2020.  - amphetamine-dextroamphetamine (ADDERALL) 10 MG Tab; Take 1 Tab by mouth 2 times a day for 30 days.  Dispense: 60 Tab; Refill: 0  - amphetamine-dextroamphetamine (ADDERALL) 10 MG Tab; Take 1 Tab by mouth 2 times a day for 30 days.  Dispense: 60 Tab; Refill: 0  - amphetamine-dextroamphetamine (ADDERALL) 10 MG Tab; Take 1 Tab by mouth 2 times a day for 30 days.  Dispense: 60 Tab; Refill: 0    2. Psychophysiological insomnia  Chronic, ongoing.  Continue Ambien 10 mg nightly as needed, emphasized the need to only use this medication as needed not scheduled.  Continue trazodone 150 mg nightly, does not need a refill at this time.  Reviewed over-the-counter melatonin dose limits, do not exceed 20 mg/day.  Controlled substance treatment agreement and urine drug screen up-to-date, due in December 2020.  Follow-up in 6 months for further refills.  - zolpidem (AMBIEN) 10 MG Tab; Take 1 Tab by mouth at bedtime as needed for  Sleep for up to 30 days.  Dispense: 30 Tab; Refill: 5    3. CHARO on CPAP  New problem to examiner.  Encourage patient to schedule follow-up with pulmonary/sleep medicine at the [facility] for evaluation and equipment.    4. Nocturia  New problem to examiner.  Patient interested in referral to urology for evaluation and possible medication management of this issue.  - REFERRAL TO UROLOGY     Educated in proper administration of medication(s) ordered today including safety, possible SE, risks, benefits, rationale and alternatives to therapy.   Supportive care, differential diagnoses, and indications for immediate follow-up discussed with patient.    Pathogenesis of diagnosis discussed including typical length and natural progression.    Instructed to return to clinic or nearest emergency department for any change in condition, further concerns, or worsening of symptoms.  Patient states understanding of the plan of care and discharge instructions.     Return in about 3 months (around 12/14/2020) for ADD/ADHD, Medication Refill.    Please note that this dictation was created using voice recognition software. I have made every reasonable attempt to correct obvious errors, but I expect that there are errors of grammar and possibly content that I did not discover before finalizing the note.    Patient was seen for at least 40 minutes total face-to-face time with greater than 50% of that time spent on counseling and care coordination.

## 2020-09-15 NOTE — ASSESSMENT & PLAN NOTE
"New problem to examiner.  Chronic problem for the patient that is uncontrolled.  Reports that he had a sleep study many years ago and was told that he has severe obstructive sleep apnea.  He is prescribed a CPAP machine, states that he does not use the machine because he does not want to spend $400 on the cleaning supplies.  States that he did get an upper respiratory infection from an un-sanitize machine in the past so he is hesitant.  Also reports that \"the mask is uncomfortable and I sleep on my side\".  He does report chronic daytime fatigue and insomnia.  Has not followed up with pulmonary or sleep medicine, was [facility].  "

## 2020-12-15 ENCOUNTER — OFFICE VISIT (OUTPATIENT)
Dept: MEDICAL GROUP | Facility: PHYSICIAN GROUP | Age: 40
End: 2020-12-15
Payer: OTHER GOVERNMENT

## 2020-12-15 VITALS
BODY MASS INDEX: 29.78 KG/M2 | TEMPERATURE: 97.8 F | DIASTOLIC BLOOD PRESSURE: 64 MMHG | HEIGHT: 70 IN | WEIGHT: 208 LBS | SYSTOLIC BLOOD PRESSURE: 108 MMHG | HEART RATE: 85 BPM | OXYGEN SATURATION: 95 %

## 2020-12-15 DIAGNOSIS — F90.2 ATTENTION DEFICIT HYPERACTIVITY DISORDER (ADHD), COMBINED TYPE: ICD-10-CM

## 2020-12-15 DIAGNOSIS — F51.04 PSYCHOPHYSIOLOGICAL INSOMNIA: ICD-10-CM

## 2020-12-15 DIAGNOSIS — E66.3 OVERWEIGHT (BMI 25.0-29.9): ICD-10-CM

## 2020-12-15 DIAGNOSIS — R35.1 NOCTURIA: ICD-10-CM

## 2020-12-15 PROCEDURE — 99214 OFFICE O/P EST MOD 30 MIN: CPT | Performed by: NURSE PRACTITIONER

## 2020-12-15 RX ORDER — DEXTROAMPHETAMINE SACCHARATE, AMPHETAMINE ASPARTATE, DEXTROAMPHETAMINE SULFATE AND AMPHETAMINE SULFATE 2.5; 2.5; 2.5; 2.5 MG/1; MG/1; MG/1; MG/1
10 TABLET ORAL 2 TIMES DAILY
Qty: 60 TAB | Refills: 0 | Status: SHIPPED | OUTPATIENT
Start: 2020-12-21 | End: 2021-01-20

## 2020-12-15 RX ORDER — ZOLPIDEM TARTRATE 10 MG/1
10 TABLET ORAL NIGHTLY PRN
Qty: 30 TAB | Refills: 5 | Status: SHIPPED | OUTPATIENT
Start: 2020-12-31 | End: 2021-03-11 | Stop reason: SDUPTHER

## 2020-12-15 RX ORDER — ROPINIROLE 1 MG/1
1 TABLET, FILM COATED ORAL
COMMUNITY
Start: 2020-12-04 | End: 2022-05-17

## 2020-12-15 RX ORDER — DEXTROAMPHETAMINE SACCHARATE, AMPHETAMINE ASPARTATE, DEXTROAMPHETAMINE SULFATE AND AMPHETAMINE SULFATE 2.5; 2.5; 2.5; 2.5 MG/1; MG/1; MG/1; MG/1
10 TABLET ORAL 2 TIMES DAILY
Qty: 60 TAB | Refills: 0 | Status: SHIPPED | OUTPATIENT
Start: 2021-01-20 | End: 2021-02-19

## 2020-12-15 RX ORDER — DEXTROAMPHETAMINE SACCHARATE, AMPHETAMINE ASPARTATE, DEXTROAMPHETAMINE SULFATE AND AMPHETAMINE SULFATE 2.5; 2.5; 2.5; 2.5 MG/1; MG/1; MG/1; MG/1
10 TABLET ORAL 2 TIMES DAILY
Qty: 60 TAB | Refills: 0 | Status: SHIPPED | OUTPATIENT
Start: 2021-02-19 | End: 2021-03-11 | Stop reason: SDUPTHER

## 2020-12-15 RX ORDER — TRAZODONE HYDROCHLORIDE 150 MG/1
150 TABLET ORAL
Qty: 30 TAB | Refills: 11 | Status: SHIPPED | OUTPATIENT
Start: 2020-12-15 | End: 2021-12-13

## 2020-12-15 RX ORDER — TAMSULOSIN HYDROCHLORIDE 0.4 MG/1
0.8 CAPSULE ORAL
Qty: 180 CAP | Refills: 0 | Status: SHIPPED | OUTPATIENT
Start: 2020-12-15 | End: 2021-03-11 | Stop reason: SDUPTHER

## 2020-12-15 ASSESSMENT — FIBROSIS 4 INDEX: FIB4 SCORE: 0.73

## 2020-12-15 NOTE — ASSESSMENT & PLAN NOTE
"Chronic, improving.  Patient has had a 12 pound weight loss since September 2020.  Reports that he has not been going to the gym due to Covid, but he is doing exercises at home including planking and stretching.  Also reports that he has decreased portion sizes.  Vitals 3/16/2020 6/11/2020 9/14/2020 12/15/2020   WEIGHT 212 220 220 208   HEIGHT 5' 10\" 5' 9\" 5' 10\" 5' 10\"   BMI 30.42 kg/m2 32.49 kg/m2 31.57 kg/m2 29.84 kg/m2     "

## 2020-12-15 NOTE — ASSESSMENT & PLAN NOTE
Chronic, ongoing.  Patient was previously prescribed tamsulosin 0.8 mg nightly for weak urinary stream and nocturia.  He had discontinued the medication because symptoms have improved, requesting to restart due to return of symptoms.  He was referred to urology for this issue, has not scheduled.

## 2020-12-15 NOTE — ASSESSMENT & PLAN NOTE
"Chronic, ongoing and related to PTSD.  Continues Ambien 10 mg nightly and trazodone 150 mg nightly.  He was also recently [facility] to help with depression and insomnia.  Has not started mirtazapine yet.  He had used marijuana in the past to help with this issue, but reports he stopped due to controlled substance prescription regulations.  The patient does have severe obstructive sleep apnea and has a CPAP, but does not use it as he cannot afford the cleaning kit.  Does not follow with pulmonary or sleep medicine regularly.  Requesting refill of Ambien.    UDS: 12/15/2020    Controlled Substance Treatment Agreement: 12/15/2020    Obtained and reviewed the patient utilization report state pharmacy database on 12/15/2020. Based on assessment of report prescription for ambien is medically necessary. Patient has not requested any early refills and exhibits no abberant behavior. I have advised patient to keep medication and safe place and to not drive, use alcohol, or take illicit drugs while taking this medication.    Patient understands this prescription is a controlled substance which is potentially habit-forming and its use is regulated by the [employer]. We also discussed the new \"black box\" warning regarding the lethal combination of opioids and benzodiazepines. Refills are subject to terms of a controlled substance agreement and patient has an updated one on file. Any refill requires an office visit. This medicine can cause nausea, sedation, confusion and accidental overdose.   "

## 2020-12-15 NOTE — ASSESSMENT & PLAN NOTE
Chronic, ongoing. Continues Adderall 10 mg in the morning around 7am and 10 mg around 11 AM. Patient continues to do well with this dosing and scheduling.  Denies side effects of the medication.  Reports that he did not tolerate controlled release in the past.  Patient is requesting refill today.    UDS: 12/15/2020    Controlled Substance Treatment Agreement: 12/15/2020    Obtained and reviewed the patient utilization report state pharmacy database on 12/15/2020. Based on assessment of report prescription for adderall and ambien is medically necessary. Patient has not requested any early refills and exhibits no abberant behavior. I have advised patient to keep medication and safe place and to not drive, use alcohol, or take illicit drugs while taking this medication.

## 2020-12-16 NOTE — PROGRESS NOTES
"CC:   Chief Complaint   Patient presents with   • ADHD     med refill      HISTORY OF THE PRESENT ILLNESS: Patient is a 40 y.o. male. This pleasant patient is here today to request medication refills.    Health Maintenance: Completed    Overweight (BMI 25.0-29.9)  Chronic, improving.  Patient has had a 12 pound weight loss since September 2020.  Reports that he has not been going to the gym due to Covid, but he is doing exercises at home including planking and stretching.  Also reports that he has decreased portion sizes.  Vitals 3/16/2020 6/11/2020 9/14/2020 12/15/2020   WEIGHT 212 220 220 208   HEIGHT 5' 10\" 5' 9\" 5' 10\" 5' 10\"   BMI 30.42 kg/m2 32.49 kg/m2 31.57 kg/m2 29.84 kg/m2       Nocturia  Chronic, ongoing.  Patient was previously prescribed tamsulosin 0.8 mg nightly for weak urinary stream and nocturia.  He had discontinued the medication because symptoms have improved, requesting to restart due to return of symptoms.  He was referred to urology for this issue, has not scheduled.    Insomnia  Chronic, ongoing and related to PTSD.  Continues Ambien 10 mg nightly and trazodone 150 mg nightly.  He was also [facility] and [facility].  Has not started mirtazapine yet.  He had used marijuana in the past to help with this issue, but reports he stopped due to controlled substance prescription regulations.  The patient does have severe obstructive sleep apnea and has a CPAP, but does not use it as he cannot afford the cleaning kit.  Does not follow with pulmonary or sleep medicine regularly.  Requesting refill of Ambien.    UDS: 12/15/2020    Controlled Substance Treatment Agreement: 12/15/2020    Obtained and reviewed the patient utilization report state pharmacy database on 12/15/2020. Based on assessment of report prescription for ambien is medically necessary. Patient has not requested any early refills and exhibits no abberant behavior. I have " "advised patient to keep medication and safe place and to not drive, use alcohol, or take illicit drugs while taking this medication.    Patient understands this prescription is a controlled substance which is potentially habit-forming and its use is regulated by the [employer]. We also discussed the new \"black box\" warning regarding the lethal combination of opioids and benzodiazepines. Refills are subject to terms of a controlled substance agreement and patient has an updated one on file. Any refill requires an office visit. This medicine can cause nausea, sedation, confusion and accidental overdose.     Attention deficit hyperactivity disorder (ADHD), combined type  Chronic, ongoing. Continues Adderall 10 mg in the morning around 7am and 10 mg around 11 AM. Patient continues to do well with this dosing and scheduling.  Denies side effects of the medication.  Reports that he did not tolerate controlled release in the past.  Patient is requesting refill today.    UDS: 12/15/2020    Controlled Substance Treatment Agreement: 12/15/2020    Obtained and reviewed the patient utilization report state pharmacy database on 12/15/2020. Based on assessment of report prescription for adderall and ambien is medically necessary. Patient has not requested any early refills and exhibits no abberant behavior. I have advised patient to keep medication and safe place and to not drive, use alcohol, or take illicit drugs while taking this medication.    Allergies: Patient has no known allergies.    Current Outpatient Medications Ordered in Epic   Medication Sig Dispense Refill   • tamsulosin (FLOMAX) 0.4 MG capsule Take 2 Caps by mouth every bedtime. 180 Cap 0   • traZODone (DESYREL) 150 MG Tab Take 1 Tab by mouth at bedtime as needed for Sleep. 30 Tab 11   • [START ON 12/21/2020] amphetamine-dextroamphetamine (ADDERALL) 10 MG Tab Take 1 Tab by mouth 2 times a day for 30 days. 60 Tab 0   • [START ON 1/20/2021] amphetamine-dextroamphetamine " (ADDERALL) 10 MG Tab Take 1 Tab by mouth 2 times a day for 30 days. 60 Tab 0   • [START ON 2021] amphetamine-dextroamphetamine (ADDERALL) 10 MG Tab Take 1 Tab by mouth 2 times a day for 30 days. 60 Tab 0   • [START ON 2020] zolpidem (AMBIEN) 10 MG Tab Take 1 Tab by mouth at bedtime as needed for Sleep for up to 30 days. 30 Tab 5   • famotidine (PEPCID) 20 MG Tab      • testosterone cypionate (DEPO-TESTOSTERONE) 200 MG/ML Solution injection INJECT 0.5 MLS INTRAMUSCULARLY WEEKLY     • omega-3 acid ethyl esters (LOVAZA) 1 GM capsule Take 1 Cap by mouth 2 Times a Day. 180 Cap 0   • vitamin D (CHOLECALCIFEROL) 1000 Unit Tab      • cyanocobalamin (VITAMIN B-12) 500 MCG Tab      • anastrozole (ARIMIDEX) 1 MG Tab Take 1 mg by mouth 3 times a week.     • naproxen (NAPROSYN) 500 MG Tab Take 500 mg by mouth 2 times a day, with meals.     • ROPINIRole (REQUIP) 1 MG Tab Take 1 mg by mouth every bedtime.       No current Epic-ordered facility-administered medications on file.      Past Medical History:   Diagnosis Date   • ADHD    • ED (erectile dysfunction)    • Hyperlipidemia    • Hypogonadism male    • Insomnia    • Lateral epicondylitis of right elbow 2018   • Left hand pain 2019   • CHARO on CPAP    • PTSD (post-traumatic stress disorder)      Past Surgical History:   Procedure Laterality Date   • CARPAL TUNNEL RELEASE Bilateral      Social History     Tobacco Use   • Smoking status: Former Smoker     Years: 2.00     Types: Cigarettes     Start date: 1997     Quit date: 1999     Years since quittin.4   • Smokeless tobacco: Never Used   • Tobacco comment: only smoked off and on for 2 years   Substance Use Topics   • Alcohol use: Not Currently   • Drug use: Not Currently     Types: Marijuana     Comment: Reports marijuana use once every few months     Social History     Social History Narrative   • Not on file     Family History   Problem Relation Age of Onset   • Hyperlipidemia Mother    •  "Hypertension Mother    • Stroke Maternal Grandmother    • Diabetes Maternal Grandfather    • Stroke Maternal Grandfather    • Diabetes Paternal Grandmother    • Stroke Paternal Grandmother    • No Known Problems Paternal Grandfather    • No Known Problems Son    • No Known Problems Sister    • No Known Problems Sister    • No Known Problems Sister    • No Known Problems Sister      ROS:    Constitutional: + Insomnia and fatigue.  No fevers, chills, malaise/fatigue.  Eyes: No eye pain.  ENT: No sore throat, congestion.   Resp: + Previously diagnosed CHARO with CPAP.  No cough, shortness of breath.  CV: No chest pain, leg swelling, palpitations.  GI: No nausea/vomiting, abdominal pain, constipation, diarrhea.  : + Weak urinary stream, nocturia, hesitancy.  No dysuria, hematuria.  MSK: No weakness.  Skin: No rashes.  Neuro: No dizziness, weakness, headaches.  Psych: + Situational depression, anxiety, PTSD, ADHD, insomnia.  No suicidal ideations.    All remaining systems reviewed and found to be negative, except as stated above.     Exam: /64 (BP Location: Left arm, Patient Position: Sitting, BP Cuff Size: Large adult)   Pulse 85   Temp 36.6 °C (97.8 °F) (Temporal)   Ht 1.778 m (5' 10\")   Wt 94.3 kg (208 lb)   SpO2 95%  Body mass index is 29.84 kg/m².    General:  Normal appearing. No distress.  HEENT:  Normocephalic. Eyes conjunctiva clear lids without ptosis, pupils equal and reactive to light accommodation, ears normal shape and contour, canals are clear bilaterally, tympanic membranes are benign, nasal mucosa benign, oropharynx is without erythema, edema or exudates.  Neck:  Supple without JVD. Thyroid is not enlarged.  Pulmonary:  Clear to ausculation.  Normal effort. No rales, ronchi, or wheezing.  Cardiovascular:  Regular rate and rhythm without murmur. Radial pulses are intact and equal bilaterally.  Neurologic:  Grossly nonfocal.  Lymph:  No cervical, supraclavicular lymph nodes are palpable.  Skin:  " Warm and dry.  No obvious lesions.  Musculoskeletal:  Normal gait. No extremity cyanosis, clubbing, or edema.  Psych:  Normal mood and affect. Alert and oriented x3. Judgment and insight is normal.     Assessment/Plan:  1. Attention deficit hyperactivity disorder (ADHD), combined type  Chronic, ongoing.  Continue Adderall 10 mg twice daily.  Urine drug screen and controlled substance treatment agreement updated today.  [facility]; Future  - Controlled Substance Treatment Agreement  - amphetamine-dextroamphetamine (ADDERALL) 10 MG Tab; Take 1 Tab by mouth 2 times a day for 30 days.  Dispense: 60 Tab; Refill: 0  - amphetamine-dextroamphetamine (ADDERALL) 10 MG Tab; Take 1 Tab by mouth 2 times a day for 30 days.  Dispense: 60 Tab; Refill: 0  - amphetamine-dextroamphetamine (ADDERALL) 10 MG Tab; Take 1 Tab by mouth 2 times a day for 30 days.  Dispense: 60 Tab; Refill: 0    2. Psychophysiological insomnia  Chronic, ongoing.  Continue Ambien 10 mg nightly as needed and trazodone 150 mg nightly as needed.  - traZODone (DESYREL) 150 MG Tab; Take 1 Tab by mouth at bedtime as needed for Sleep.  Dispense: 30 Tab; Refill: 11  - zolpidem (AMBIEN) 10 MG Tab; Take 1 Tab by mouth at bedtime as needed for Sleep for up to 30 days.  Dispense: 30 Tab; Refill: 5    3. Nocturia  Chronic, ongoing.  Patient requesting to restart tamsulosin.  Reminded patient to schedule appointment with urology for further management.  - tamsulosin (FLOMAX) 0.4 MG capsule; Take 2 Caps by mouth every bedtime.  Dispense: 180 Cap; Refill: 0    4. Overweight (BMI 25.0-29.9)  Chronic, improving.  Encouraged diet high in fruits, vegetables, and fiber. And a diet low in salt, refined carbohydrates, cholesterol, saturated fat, and trans fatty acids.    Encouraged a minimum of 30 minutes of moderate intensity aerobic exercise (eg, brisk walking) is recommended on five days each week. Or 30 minutes of vigorous-intensity aerobic exercise (eg,  jogging) on three days each week.   Patient's body mass index is 29.84 kg/m². Exercise and nutrition counseling were performed at this visit.    Educated in proper administration of medication(s) ordered today including safety, possible SE, risks, benefits, rationale and alternatives to therapy.   Supportive care, differential diagnoses, and indications for immediate follow-up discussed with patient.    Pathogenesis of diagnosis discussed including typical length and natural progression.    Instructed to return to clinic or nearest emergency department for any change in condition, further concerns, or worsening of symptoms.  Patient states understanding of the plan of care and discharge instructions.     Return in about 3 months (around 3/15/2021) for ADD/ADHD, Medication Refill.    Please note that this dictation was created using voice recognition software. I have made every reasonable attempt to correct obvious errors, but I expect that there are errors of grammar and possibly content that I did not discover before finalizing the note.

## 2021-01-14 DIAGNOSIS — F90.2 ATTENTION DEFICIT HYPERACTIVITY DISORDER (ADHD), COMBINED TYPE: ICD-10-CM

## 2021-03-11 ENCOUNTER — OFFICE VISIT (OUTPATIENT)
Dept: MEDICAL GROUP | Facility: PHYSICIAN GROUP | Age: 41
End: 2021-03-11
Payer: OTHER GOVERNMENT

## 2021-03-11 VITALS
DIASTOLIC BLOOD PRESSURE: 74 MMHG | BODY MASS INDEX: 29.35 KG/M2 | HEIGHT: 70 IN | TEMPERATURE: 97 F | RESPIRATION RATE: 16 BRPM | SYSTOLIC BLOOD PRESSURE: 150 MMHG | OXYGEN SATURATION: 95 % | WEIGHT: 205 LBS | HEART RATE: 106 BPM

## 2021-03-11 DIAGNOSIS — F51.04 PSYCHOPHYSIOLOGICAL INSOMNIA: ICD-10-CM

## 2021-03-11 DIAGNOSIS — F43.21 SITUATIONAL DEPRESSION: ICD-10-CM

## 2021-03-11 DIAGNOSIS — R35.1 NOCTURIA: ICD-10-CM

## 2021-03-11 DIAGNOSIS — F90.2 ATTENTION DEFICIT HYPERACTIVITY DISORDER (ADHD), COMBINED TYPE: ICD-10-CM

## 2021-03-11 PROCEDURE — 99214 OFFICE O/P EST MOD 30 MIN: CPT | Performed by: NURSE PRACTITIONER

## 2021-03-11 RX ORDER — DEXTROAMPHETAMINE SACCHARATE, AMPHETAMINE ASPARTATE, DEXTROAMPHETAMINE SULFATE AND AMPHETAMINE SULFATE 2.5; 2.5; 2.5; 2.5 MG/1; MG/1; MG/1; MG/1
10 TABLET ORAL 2 TIMES DAILY
Qty: 60 TABLET | Refills: 0 | Status: SHIPPED | OUTPATIENT
Start: 2021-05-22 | End: 2021-06-21 | Stop reason: SDUPTHER

## 2021-03-11 RX ORDER — DEXTROAMPHETAMINE SACCHARATE, AMPHETAMINE ASPARTATE, DEXTROAMPHETAMINE SULFATE AND AMPHETAMINE SULFATE 2.5; 2.5; 2.5; 2.5 MG/1; MG/1; MG/1; MG/1
10 TABLET ORAL 2 TIMES DAILY
Qty: 60 TABLET | Refills: 0 | Status: SHIPPED | OUTPATIENT
Start: 2021-03-23 | End: 2021-04-22

## 2021-03-11 RX ORDER — DEXTROAMPHETAMINE SACCHARATE, AMPHETAMINE ASPARTATE, DEXTROAMPHETAMINE SULFATE AND AMPHETAMINE SULFATE 2.5; 2.5; 2.5; 2.5 MG/1; MG/1; MG/1; MG/1
10 TABLET ORAL 2 TIMES DAILY
Qty: 60 TABLET | Refills: 0 | Status: SHIPPED | OUTPATIENT
Start: 2021-04-22 | End: 2021-05-22

## 2021-03-11 RX ORDER — ZOLPIDEM TARTRATE 10 MG/1
10 TABLET ORAL NIGHTLY PRN
Qty: 30 TABLET | Refills: 5 | Status: SHIPPED | OUTPATIENT
Start: 2021-03-28 | End: 2021-09-16 | Stop reason: SDUPTHER

## 2021-03-11 RX ORDER — TAMSULOSIN HYDROCHLORIDE 0.4 MG/1
0.8 CAPSULE ORAL
Qty: 180 CAPSULE | Refills: 3 | Status: SHIPPED | OUTPATIENT
Start: 2021-03-11 | End: 2021-12-13

## 2021-03-11 ASSESSMENT — PATIENT HEALTH QUESTIONNAIRE - PHQ9
5. POOR APPETITE OR OVEREATING: 1 - SEVERAL DAYS
CLINICAL INTERPRETATION OF PHQ2 SCORE: 4
SUM OF ALL RESPONSES TO PHQ QUESTIONS 1-9: 10

## 2021-03-11 ASSESSMENT — FIBROSIS 4 INDEX: FIB4 SCORE: 0.75

## 2021-03-11 NOTE — PROGRESS NOTES
CC:   Chief Complaint   Patient presents with   • Medication Refill     ambien, adderall, Tamsulosin     HISTORY OF THE PRESENT ILLNESS: Patient is a 41 y.o. male. This pleasant patient is here today to request medication refills.    Health Maintenance: Completed    Attention deficit hyperactivity disorder (ADHD), combined type  Chronic, ongoing. Continues Adderall 10 mg in the morning around 7am and 10 mg around 11 AM. Patient continues to do well with this dosing and scheduling.  Denies side effects of the medication.  Reports that he did not tolerate controlled release in the past.  Patient is requesting refill today.    UDS: 3/11/2021     Controlled Substance Treatment Agreement: 3/11/2021   Obtained and reviewed the patient utilization report state pharmacy database on 3/11/2021. Based on assessment of report prescription for adderall and ambien is medically necessary. Patient has not requested any early refills and exhibits no abberant behavior. I have advised patient to keep medication and safe place and to not drive, use alcohol, or take illicit drugs while taking this medication.    Insomnia  Chronic, ongoing and related to PTSD.  Continues Ambien 10 mg nightly and trazodone 150 mg nightly.  He had used marijuana in the past to help with this issue, but reports he stopped due to controlled substance prescription regulations.  The patient does have severe obstructive sleep apnea and has a CPAP, but does not use it as he cannot afford the cleaning kit.  Does not follow with pulmonary or sleep medicine regularly.  Requesting refill of Ambien.    UDS: 3/11/2021    Controlled Substance Treatment Agreement: 3/11/2021     Obtained and reviewed the patient utilization report state pharmacy database on 3/11/2021. Based on assessment of report prescription for ambien is medically necessary. Patient has not requested any early refills and exhibits no abberant behavior. I have advised patient to keep medication and  "safe place and to not drive, use alcohol, or take illicit drugs while taking this medication.    Patient understands this prescription is a controlled substance which is potentially habit-forming and its use is regulated by the [employer]. We also discussed the new \"black box\" warning regarding the lethal combination of opioids and benzodiazepines. Refills are subject to terms of a controlled substance agreement and patient has an updated one on file. Any refill requires an office visit. This medicine can cause nausea, sedation, confusion and accidental overdose.     Nocturia  Chronic, ongoing.  Continues tamsulosin 0.8 mg nightly.  Patient is tolerating this medication with no side effects.  He is requesting refills.    Situational depression  Chronic, ongoing.  [facility]  He was prescribed mirtazapine but decided not to take it.    Allergies: Patient has no known allergies.    Current Outpatient Medications Ordered in Epic   Medication Sig Dispense Refill   • tamsulosin (FLOMAX) 0.4 MG capsule Take 2 Capsules by mouth every bedtime. 180 capsule 3   • [START ON 3/23/2021] amphetamine-dextroamphetamine (ADDERALL) 10 MG Tab Take 1 tablet by mouth 2 times a day for 30 days. 60 tablet 0   • [START ON 3/28/2021] zolpidem (AMBIEN) 10 MG Tab Take 1 tablet by mouth at bedtime as needed for Sleep for up to 30 days. 30 tablet 5   • [START ON 4/22/2021] amphetamine-dextroamphetamine (ADDERALL) 10 MG Tab Take 1 tablet by mouth 2 times a day for 30 days. 60 tablet 0   • [START ON 5/22/2021] amphetamine-dextroamphetamine (ADDERALL) 10 MG Tab Take 1 tablet by mouth 2 times a day for 30 days. 60 tablet 0   • ROPINIRole (REQUIP) 1 MG Tab Take 1 mg by mouth every bedtime.     • traZODone (DESYREL) 150 MG Tab Take 1 Tab by mouth at bedtime as needed for Sleep. 30 Tab 11   • famotidine (PEPCID) 20 MG Tab      • testosterone cypionate (DEPO-TESTOSTERONE) 200 MG/ML Solution injection INJECT 0.5 MLS INTRAMUSCULARLY " WEEKLY     • omega-3 acid ethyl esters (LOVAZA) 1 GM capsule Take 1 Cap by mouth 2 Times a Day. 180 Cap 0   • vitamin D (CHOLECALCIFEROL) 1000 Unit Tab      • cyanocobalamin (VITAMIN B-12) 500 MCG Tab      • anastrozole (ARIMIDEX) 1 MG Tab Take 1 mg by mouth 3 times a week.     • naproxen (NAPROSYN) 500 MG Tab Take 500 mg by mouth 2 times a day, with meals.       No current Epic-ordered facility-administered medications on file.     Past Medical History:   Diagnosis Date   • ADHD    • ED (erectile dysfunction)    • Hyperlipidemia    • Hypogonadism male    • Insomnia    • Lateral epicondylitis of right elbow 2018   • Left hand pain 2019   • CHARO on CPAP    • PTSD (post-traumatic stress disorder)      Past Surgical History:   Procedure Laterality Date   • CARPAL TUNNEL RELEASE Bilateral      Social History     Tobacco Use   • Smoking status: Former Smoker     Years: 2.00     Types: Cigarettes     Start date: 1997     Quit date: 1999     Years since quittin.7   • Smokeless tobacco: Never Used   • Tobacco comment: only smoked off and on for 2 years   Substance Use Topics   • Alcohol use: Not Currently   • Drug use: Not Currently     Types: Marijuana     Comment: Reports marijuana use once every few months     Social History     Social History Narrative   • Not on file     Family History   Problem Relation Age of Onset   • Hyperlipidemia Mother    • Hypertension Mother    • Stroke Maternal Grandmother    • Diabetes Maternal Grandfather    • Stroke Maternal Grandfather    • Diabetes Paternal Grandmother    • Stroke Paternal Grandmother    • No Known Problems Paternal Grandfather    • No Known Problems Son    • No Known Problems Sister    • No Known Problems Sister    • No Known Problems Sister    • No Known Problems Sister      ROS:    Constitutional: + Insomnia.  No fevers, chills, malaise/fatigue.  Eyes: No eye pain.  ENT: No sore throat, congestion.   Resp: + Previously diagnosed CHARO with CPAP.   "No cough, shortness of breath.  CV: No chest pain, leg swelling, palpitations.  GI: No nausea/vomiting, abdominal pain, constipation, diarrhea.  : + Weak urinary stream, nocturia, hesitancy.  No dysuria, hematuria.  MSK: No weakness.  Skin: No rashes.  Neuro: No dizziness, weakness, headaches.  Psych: + Situational depression, anxiety, PTSD, ADHD, insomnia.  No suicidal ideations.    All remaining systems reviewed and found to be negative, except as stated above.     Exam: /74 (BP Location: Left arm, Patient Position: Sitting, BP Cuff Size: Adult)   Pulse (!) 106   Temp 36.1 °C (97 °F) (Temporal)   Resp 16   Ht 1.778 m (5' 10\")   Wt 93 kg (205 lb)   SpO2 95%  Body mass index is 29.41 kg/m².    General: Well nourished, well developed male in NAD, awake and conversant.  Eyes: Normal conjunctiva, anicteric.  Round symmetrical pupils.  ENT: Hearing grossly intact.  No nasal discharge.  Neck: Neck is supple.  No masses or thyromegaly.  CV: No lower extremity edema.  Respiratory: Respirations are nonlabored.  No wheezing.  Abdomen: Non-Distended.  Skin: Warm.  No rashes or ulcers.  MSK: Normal ambulation.  No clubbing or cyanosis.  Neuro: Sensation and CN II-XII grossly normal.  Psych: Alert and oriented.  Cooperative, appropriate mood and affect, normal judgment.    Assessment/Plan:  1. Attention deficit hyperactivity disorder (ADHD), combined type  Chronic, ongoing.  Continue Adderall 10 mg twice a day.  Controlled substance treatment agreement signed.  Urine drug screen dated.  - [facility] PAIN MANAGEMENT SCREEN; Future  - Controlled Substance Treatment Agreement  - amphetamine-dextroamphetamine (ADDERALL) 10 MG Tab; Take 1 tablet by mouth 2 times a day for 30 days.  Dispense: 60 tablet; Refill: 0  - amphetamine-dextroamphetamine (ADDERALL) 10 MG Tab; Take 1 tablet by mouth 2 times a day for 30 days.  Dispense: 60 tablet; Refill: 0  - amphetamine-dextroamphetamine (ADDERALL) 10 MG Tab; Take 1 tablet by " mouth 2 times a day for 30 days.  Dispense: 60 tablet; Refill: 0    2. Psychophysiological insomnia  Chronic, ongoing.  Continue zolpidem 10 mg at bedtime as needed for sleep. Controlled substance treatment agreement signed.  Urine drug screen dated.  - [facility] PAIN MANAGEMENT SCREEN; Future  - Controlled Substance Treatment Agreement  - zolpidem (AMBIEN) 10 MG Tab; Take 1 tablet by mouth at bedtime as needed for Sleep for up to 30 days.  Dispense: 30 tablet; Refill: 5    3. Nocturia  Chronic, ongoing.  Continue tamsulosin 0.8 mg every bedtime.  - tamsulosin (FLOMAX) 0.4 MG capsule; Take 2 Capsules by mouth every bedtime.  Dispense: 180 capsule; Refill: 3    4. Situational depression  Chronic, ongoing.  [facility]  - Patient has been identified as having a positive depression screening. Appropriate orders and counseling have been given.      Educated in proper administration of medication(s) ordered today including safety, possible SE, risks, benefits, rationale and alternatives to therapy.   Supportive care, differential diagnoses, and indications for immediate follow-up discussed with patient.    Pathogenesis of diagnosis discussed including typical length and natural progression.    Instructed to return to clinic or nearest emergency department for any change in condition, further concerns, or worsening of symptoms.  Patient states understanding of the plan of care and discharge instructions.     Return in about 3 months (around 6/11/2021) for Virtual Visit, ADD/ADHD, Medication Refill.    Please note that this dictation was created using voice recognition software. I have made every reasonable attempt to correct obvious errors, but I expect that there are errors of grammar and possibly content that I did not discover before finalizing the note.

## 2021-03-11 NOTE — ASSESSMENT & PLAN NOTE
"Chronic, ongoing and related to PTSD.  Continues Ambien 10 mg nightly and trazodone 150 mg nightly.  He had used marijuana in the past to help with this issue, but reports he stopped due to controlled substance prescription regulations.  The patient does have severe obstructive sleep apnea and has a CPAP, but does not use it as he cannot afford the cleaning kit.  Does not follow with pulmonary or sleep medicine regularly.  Requesting refill of Ambien.    UDS: 3/11/2021    Controlled Substance Treatment Agreement: 3/11/2021     Obtained and reviewed the patient utilization report state pharmacy database on 3/11/2021. Based on assessment of report prescription for ambien is medically necessary. Patient has not requested any early refills and exhibits no abberant behavior. I have advised patient to keep medication and safe place and to not drive, use alcohol, or take illicit drugs while taking this medication.    Patient understands this prescription is a controlled substance which is potentially habit-forming and its use is regulated by the [facility]. We also discussed the new \"black box\" warning regarding the lethal combination of opioids and benzodiazepines. Refills are subject to terms of a controlled substance agreement and patient has an updated one on file. Any refill requires an office visit. This medicine can cause nausea, sedation, confusion and accidental overdose.   "

## 2021-03-11 NOTE — ASSESSMENT & PLAN NOTE
Chronic, ongoing.  [facility].  He was prescribed mirtazapine but decided not to take it.

## 2021-03-11 NOTE — ASSESSMENT & PLAN NOTE
Chronic, ongoing. Continues Adderall 10 mg in the morning around 7am and 10 mg around 11 AM. Patient continues to do well with this dosing and scheduling.  Denies side effects of the medication.  Reports that he did not tolerate controlled release in the past.  Patient is requesting refill today.    UDS: 3/11/2021     Controlled Substance Treatment Agreement: 3/11/2021   Obtained and reviewed the patient utilization report state pharmacy database on 3/11/2021. Based on assessment of report prescription for adderall and ambien is medically necessary. Patient has not requested any early refills and exhibits no abberant behavior. I have advised patient to keep medication and safe place and to not drive, use alcohol, or take illicit drugs while taking this medication.

## 2021-03-11 NOTE — ASSESSMENT & PLAN NOTE
Chronic, ongoing.  Continues tamsulosin 0.8 mg nightly.  Patient is tolerating this medication with no side effects.  He is requesting refills.

## 2021-06-21 ENCOUNTER — TELEMEDICINE (OUTPATIENT)
Dept: MEDICAL GROUP | Facility: PHYSICIAN GROUP | Age: 41
End: 2021-06-21
Payer: OTHER GOVERNMENT

## 2021-06-21 VITALS — WEIGHT: 215 LBS | BODY MASS INDEX: 30.78 KG/M2 | HEIGHT: 70 IN | TEMPERATURE: 97.4 F

## 2021-06-21 DIAGNOSIS — F90.2 ATTENTION DEFICIT HYPERACTIVITY DISORDER (ADHD), COMBINED TYPE: ICD-10-CM

## 2021-06-21 PROCEDURE — 99214 OFFICE O/P EST MOD 30 MIN: CPT | Mod: GT,CR | Performed by: NURSE PRACTITIONER

## 2021-06-21 RX ORDER — DEXTROAMPHETAMINE SACCHARATE, AMPHETAMINE ASPARTATE, DEXTROAMPHETAMINE SULFATE AND AMPHETAMINE SULFATE 2.5; 2.5; 2.5; 2.5 MG/1; MG/1; MG/1; MG/1
10 TABLET ORAL 2 TIMES DAILY
Qty: 60 TABLET | Refills: 0 | Status: SHIPPED | OUTPATIENT
Start: 2021-07-21 | End: 2021-08-20

## 2021-06-21 RX ORDER — DEXTROAMPHETAMINE SACCHARATE, AMPHETAMINE ASPARTATE, DEXTROAMPHETAMINE SULFATE AND AMPHETAMINE SULFATE 2.5; 2.5; 2.5; 2.5 MG/1; MG/1; MG/1; MG/1
10 TABLET ORAL 2 TIMES DAILY
Qty: 60 TABLET | Refills: 0 | Status: SHIPPED | OUTPATIENT
Start: 2021-08-20 | End: 2021-09-16 | Stop reason: SDUPTHER

## 2021-06-21 RX ORDER — DEXTROAMPHETAMINE SACCHARATE, AMPHETAMINE ASPARTATE, DEXTROAMPHETAMINE SULFATE AND AMPHETAMINE SULFATE 2.5; 2.5; 2.5; 2.5 MG/1; MG/1; MG/1; MG/1
10 TABLET ORAL 2 TIMES DAILY
Qty: 60 TABLET | Refills: 0 | Status: SHIPPED | OUTPATIENT
Start: 2021-06-21 | End: 2021-07-21

## 2021-06-21 ASSESSMENT — FIBROSIS 4 INDEX: FIB4 SCORE: 0.75

## 2021-06-21 NOTE — PROGRESS NOTES
Virtual Visit: Established Patient   This visit was conducted via Zoom using secure and encrypted videoconferencing technology. The patient was in a private location in the state of [place].    The patient's identity was confirmed and verbal consent was obtained for this virtual visit.    Subjective:   CC:   Chief Complaint   Patient presents with   • Medication Refill     ADDERALL     Jitendra Ugalde is a 41 y.o. male presenting for evaluation and management of:    Attention deficit hyperactivity disorder (ADHD), combined type  Chronic, ongoing. Continues Adderall 10 mg in the morning around 7am and 10 mg around 11 AM. Patient continues to do well with this dosing and scheduling.  Denies side effects of the medication.  Reports that he did not tolerate controlled release in the past.  Patient is requesting refill today.    UDS: 3/11/2021     Controlled Substance Treatment Agreement: 3/11/2021     Obtained and reviewed the patient utilization report state pharmacy database on 6/21/2021. Based on assessment of report prescription for adderall and ambien is medically necessary. Patient has not requested any early refills and exhibits no abberant behavior. I have advised patient to keep medication and safe place and to not drive, use alcohol, or take illicit drugs while taking this medication.     ROS   Denies any recent fevers or chills. No nausea or vomiting. No chest pains or shortness of breath.     No Known Allergies    Current medicines (including changes today)  Current Outpatient Medications   Medication Sig Dispense Refill   • amphetamine-dextroamphetamine (ADDERALL) 10 MG Tab Take 1 tablet by mouth 2 times a day for 30 days. 60 tablet 0   • [START ON 7/21/2021] amphetamine-dextroamphetamine (ADDERALL) 10 MG Tab Take 1 tablet by mouth 2 times a day for 30 days. 60 tablet 0   • [START ON 8/20/2021] amphetamine-dextroamphetamine (ADDERALL) 10 MG Tab Take 1 tablet by mouth 2 times a day for 30 days. 60 tablet 0   •  tamsulosin (FLOMAX) 0.4 MG capsule Take 2 Capsules by mouth every bedtime. 180 capsule 3   • ROPINIRole (REQUIP) 1 MG Tab Take 1 mg by mouth every bedtime.     • traZODone (DESYREL) 150 MG Tab Take 1 Tab by mouth at bedtime as needed for Sleep. 30 Tab 11   • famotidine (PEPCID) 20 MG Tab      • testosterone cypionate (DEPO-TESTOSTERONE) 200 MG/ML Solution injection INJECT 0.5 MLS INTRAMUSCULARLY WEEKLY     • omega-3 acid ethyl esters (LOVAZA) 1 GM capsule Take 1 Cap by mouth 2 Times a Day. 180 Cap 0   • vitamin D (CHOLECALCIFEROL) 1000 Unit Tab      • cyanocobalamin (VITAMIN B-12) 500 MCG Tab      • anastrozole (ARIMIDEX) 1 MG Tab Take 1 mg by mouth 3 times a week.     • naproxen (NAPROSYN) 500 MG Tab Take 500 mg by mouth 2 times a day, with meals.       No current facility-administered medications for this visit.     Patient Active Problem List    Diagnosis Date Noted   • CHARO on CPAP 09/14/2020   • Nocturia 09/14/2020   • Overweight (BMI 25.0-29.9) 06/24/2019   • Situational depression 03/21/2019   • Impaired glucose tolerance 10/02/2018   • Dyslipidemia 03/30/2018   • Hypogonadism male 01/03/2018   • Insomnia 01/03/2018   • PTSD (post-traumatic stress disorder) 01/03/2018   • Anxiety 01/03/2018   • Attention deficit hyperactivity disorder (ADHD), combined type 01/03/2018   • Bulging of lumbar intervertebral disc 01/03/2018     Family History   Problem Relation Age of Onset   • Hyperlipidemia Mother    • Hypertension Mother    • Stroke Maternal Grandmother    • Diabetes Maternal Grandfather    • Stroke Maternal Grandfather    • Diabetes Paternal Grandmother    • Stroke Paternal Grandmother    • No Known Problems Paternal Grandfather    • No Known Problems Son    • No Known Problems Sister    • No Known Problems Sister    • No Known Problems Sister    • No Known Problems Sister      He  has a past medical history of ADHD, ED (erectile dysfunction), Hyperlipidemia, Hypogonadism male, Insomnia, Lateral epicondylitis  "of right elbow (12/26/2018), Left hand pain (9/25/2019), CHARO on CPAP, and PTSD (post-traumatic stress disorder).  He  has a past surgical history that includes carpal tunnel release (Bilateral).     Objective:   Temp 36.3 °C (97.4 °F) (Temporal) Comment: Per Patient  Ht 1.778 m (5' 10\") Comment: Per Patient  Wt 97.5 kg (215 lb) Comment: Per Patient  BMI 30.85 kg/m²     Physical Exam:  Constitutional: Alert, no distress, well-groomed.  Skin: No rashes in visible areas.  Eye: Round. Conjunctiva clear, lids normal. No icterus.   ENMT: Lips pink without lesions, good dentition, moist mucous membranes. Phonation normal.  Neck: No masses, no thyromegaly. Moves freely without pain.  Respiratory: Unlabored respiratory effort, no cough or audible wheeze  Psych: Alert and oriented x3, normal affect and mood.     Assessment and Plan:   The following treatment plan was discussed:   1. Attention deficit hyperactivity disorder (ADHD), combined type  Chronic, ongoing.  Continue adderall 10 mg twice daily, 3 month refills sent to pharmacy.  Controlled substance treatment agreement and UDS up to date.  Follow up in 3 months for medication refills.  - amphetamine-dextroamphetamine (ADDERALL) 10 MG Tab; Take 1 tablet by mouth 2 times a day for 30 days.  Dispense: 60 tablet; Refill: 0  - amphetamine-dextroamphetamine (ADDERALL) 10 MG Tab; Take 1 tablet by mouth 2 times a day for 30 days.  Dispense: 60 tablet; Refill: 0  - amphetamine-dextroamphetamine (ADDERALL) 10 MG Tab; Take 1 tablet by mouth 2 times a day for 30 days.  Dispense: 60 tablet; Refill: 0     Follow-up: Return in about 3 months (around 9/21/2021) for Virtual Visit, ADD/ADHD, Insomnia, Medication Refill.       Please note that this dictation was created using voice recognition software. I have worked with consultants from the vendor as well as technical experts from [facility] [employer] to optimize the interface. I have made every reasonable attempt to correct obvious errors, " but I expect that there are errors of grammar and possibly content that I did not discover before finalizing the note.

## 2021-06-21 NOTE — ASSESSMENT & PLAN NOTE
Chronic, ongoing. Continues Adderall 10 mg in the morning around 7am and 10 mg around 11 AM. Patient continues to do well with this dosing and scheduling.  Denies side effects of the medication.  Reports that he did not tolerate controlled release in the past.  Patient is requesting refill today.    UDS: 3/11/2021     Controlled Substance Treatment Agreement: 3/11/2021     Obtained and reviewed the patient utilization report state pharmacy database on 6/21/2021. Based on assessment of report prescription for adderall and ambien is medically necessary. Patient has not requested any early refills and exhibits no abberant behavior. I have advised patient to keep medication and safe place and to not drive, use alcohol, or take illicit drugs while taking this medication.

## 2021-09-16 ENCOUNTER — TELEMEDICINE (OUTPATIENT)
Dept: MEDICAL GROUP | Facility: PHYSICIAN GROUP | Age: 41
End: 2021-09-16
Payer: OTHER GOVERNMENT

## 2021-09-16 VITALS — WEIGHT: 200 LBS | BODY MASS INDEX: 29.62 KG/M2 | HEIGHT: 69 IN | TEMPERATURE: 97.6 F

## 2021-09-16 DIAGNOSIS — F51.04 PSYCHOPHYSIOLOGICAL INSOMNIA: ICD-10-CM

## 2021-09-16 DIAGNOSIS — L91.8 MULTIPLE ACQUIRED SKIN TAGS: ICD-10-CM

## 2021-09-16 DIAGNOSIS — D22.9 CHANGING NEVUS: ICD-10-CM

## 2021-09-16 DIAGNOSIS — R73.02 IMPAIRED GLUCOSE TOLERANCE: ICD-10-CM

## 2021-09-16 DIAGNOSIS — E78.5 DYSLIPIDEMIA: ICD-10-CM

## 2021-09-16 DIAGNOSIS — E55.9 VITAMIN D DEFICIENCY: ICD-10-CM

## 2021-09-16 DIAGNOSIS — Z00.00 ROUTINE HEALTH MAINTENANCE: ICD-10-CM

## 2021-09-16 DIAGNOSIS — F90.2 ATTENTION DEFICIT HYPERACTIVITY DISORDER (ADHD), COMBINED TYPE: ICD-10-CM

## 2021-09-16 DIAGNOSIS — E66.3 OVERWEIGHT (BMI 25.0-29.9): ICD-10-CM

## 2021-09-16 PROCEDURE — 99214 OFFICE O/P EST MOD 30 MIN: CPT | Mod: 95 | Performed by: NURSE PRACTITIONER

## 2021-09-16 RX ORDER — SILDENAFIL 100 MG/1
TABLET, FILM COATED ORAL
COMMUNITY
Start: 2021-08-14 | End: 2021-09-16

## 2021-09-16 RX ORDER — DEXTROAMPHETAMINE SACCHARATE, AMPHETAMINE ASPARTATE, DEXTROAMPHETAMINE SULFATE AND AMPHETAMINE SULFATE 2.5; 2.5; 2.5; 2.5 MG/1; MG/1; MG/1; MG/1
10 TABLET ORAL 2 TIMES DAILY
Qty: 60 TABLET | Refills: 0 | Status: SHIPPED | OUTPATIENT
Start: 2021-11-19 | End: 2021-12-13 | Stop reason: SDUPTHER

## 2021-09-16 RX ORDER — DEXTROAMPHETAMINE SACCHARATE, AMPHETAMINE ASPARTATE, DEXTROAMPHETAMINE SULFATE AND AMPHETAMINE SULFATE 2.5; 2.5; 2.5; 2.5 MG/1; MG/1; MG/1; MG/1
10 TABLET ORAL 2 TIMES DAILY
Qty: 60 TABLET | Refills: 0 | Status: SHIPPED | OUTPATIENT
Start: 2021-10-20 | End: 2021-11-19

## 2021-09-16 RX ORDER — DEXTROAMPHETAMINE SACCHARATE, AMPHETAMINE ASPARTATE, DEXTROAMPHETAMINE SULFATE AND AMPHETAMINE SULFATE 2.5; 2.5; 2.5; 2.5 MG/1; MG/1; MG/1; MG/1
10 TABLET ORAL 2 TIMES DAILY
Qty: 60 TABLET | Refills: 0 | Status: SHIPPED | OUTPATIENT
Start: 2021-09-20 | End: 2021-10-20

## 2021-09-16 RX ORDER — ZOLPIDEM TARTRATE 10 MG/1
TABLET ORAL
COMMUNITY
Start: 2021-08-19 | End: 2021-09-16

## 2021-09-16 RX ORDER — ZOLPIDEM TARTRATE 10 MG/1
10 TABLET ORAL NIGHTLY PRN
Qty: 30 TABLET | Refills: 5 | Status: SHIPPED | OUTPATIENT
Start: 2021-09-18 | End: 2021-12-13 | Stop reason: SDUPTHER

## 2021-09-16 ASSESSMENT — FIBROSIS 4 INDEX: FIB4 SCORE: 0.75

## 2021-09-16 NOTE — ASSESSMENT & PLAN NOTE
Chronic, ongoing. Continues Adderall 10 mg in the morning around 7am and 10 mg around 11 AM. Patient continues to do well with this dosing and scheduling.  Denies side effects of the medication.  Reports that he did not tolerate controlled release in the past.  Patient is requesting refill today.    UDS: 3/11/2021     Controlled Substance Treatment Agreement: 3/11/2021     Obtained and reviewed the patient utilization report state pharmacy database on 9/16/2021. Based on assessment of report prescription for adderall and ambien is medically necessary. Patient has not requested any early refills and exhibits no abberant behavior. I have advised patient to keep medication and safe place and to not drive, use alcohol, or take illicit drugs while taking this medication.

## 2021-09-16 NOTE — ASSESSMENT & PLAN NOTE
"Chronic, ongoing and related to PTSD. Continues Ambien 10 mg nightly and trazodone 150 mg nightly. He had used marijuana in the past to help with this issue, but reports he stopped due to controlled substance prescription regulations. The patient does have severe obstructive sleep apnea and has a CPAP, but does not use it as he cannot afford the cleaning kit.  Does not follow with pulmonary or sleep medicine regularly.  Requesting refill of Ambien.    UDS: 3/11/2021    Controlled Substance Treatment Agreement: 3/11/2021     Obtained and reviewed the patient utilization report state pharmacy database on 9/16/2021. Based on assessment of report prescription for ambien is medically necessary. Patient has not requested any early refills and exhibits no abberant behavior. I have advised patient to keep medication and safe place and to not drive, use alcohol, or take illicit drugs while taking this medication.    Patient understands this prescription is a controlled substance which is potentially habit-forming and its use is regulated by the [employer]. We also discussed the new \"black box\" warning regarding the lethal combination of opioids and benzodiazepines. Refills are subject to terms of a controlled substance agreement and patient has an updated one on file. Any refill requires an office visit. This medicine can cause nausea, sedation, confusion and accidental overdose.   "

## 2021-09-16 NOTE — PROGRESS NOTES
Virtual Visit: Established Patient   This visit was conducted via Zoom using secure and encrypted videoconferencing technology.   The patient was in a private location in the state of [place].    The patient's identity was confirmed and verbal consent was obtained for this virtual visit.    Subjective:   CC:   Chief Complaint   Patient presents with   • Medication Refill     Ambien, Adderall     Jitendra Ugalde is a 41 y.o. male presenting for evaluation and management of:    Attention deficit hyperactivity disorder (ADHD), combined type  Chronic, ongoing. Continues Adderall 10 mg in the morning around 7am and 10 mg around 11 AM. Patient continues to do well with this dosing and scheduling.  Denies side effects of the medication.  Reports that he did not tolerate controlled release in the past.  Patient is requesting refill today.    UDS: 3/11/2021     Controlled Substance Treatment Agreement: 3/11/2021     Obtained and reviewed the patient utilization report state pharmacy database on 9/16/2021. Based on assessment of report prescription for adderall and ambien is medically necessary. Patient has not requested any early refills and exhibits no abberant behavior. I have advised patient to keep medication and safe place and to not drive, use alcohol, or take illicit drugs while taking this medication.    Insomnia  Chronic, ongoing and related to PTSD. Continues Ambien 10 mg nightly and trazodone 150 mg nightly. He had used marijuana in the past to help with this issue, but reports he stopped due to controlled substance prescription regulations. The patient does have severe obstructive sleep apnea and has a CPAP, but does not use it as he cannot afford the cleaning kit.  Does not follow with pulmonary or sleep medicine regularly.  Requesting refill of Ambien.    UDS: 3/11/2021    Controlled Substance Treatment Agreement: 3/11/2021     Obtained and reviewed the patient utilization report state pharmacy database on  "9/16/2021. Based on assessment of report prescription for ambien is medically necessary. Patient has not requested any early refills and exhibits no abberant behavior. I have advised patient to keep medication and safe place and to not drive, use alcohol, or take illicit drugs while taking this medication.    Patient understands this prescription is a controlled substance which is potentially habit-forming and its use is regulated by the [facility]. We also discussed the new \"black box\" warning regarding the lethal combination of opioids and benzodiazepines. Refills are subject to terms of a controlled substance agreement and patient has an updated one on file. Any refill requires an office visit. This medicine can cause nausea, sedation, confusion and accidental overdose.      ROS   See HPI    Current medicines (including changes today)  Current Outpatient Medications   Medication Sig Dispense Refill   • clomiPHENE (CLOMID) 50 MG tablet      • [START ON 9/20/2021] amphetamine-dextroamphetamine (ADDERALL) 10 MG Tab Take 1 Tablet by mouth 2 times a day for 30 days. 60 Tablet 0   • [START ON 10/20/2021] amphetamine-dextroamphetamine (ADDERALL) 10 MG Tab Take 1 Tablet by mouth 2 times a day for 30 days. 60 Tablet 0   • [START ON 11/19/2021] amphetamine-dextroamphetamine (ADDERALL) 10 MG Tab Take 1 Tablet by mouth 2 times a day for 30 days. 60 Tablet 0   • [START ON 9/18/2021] zolpidem (AMBIEN) 10 MG Tab Take 1 Tablet by mouth at bedtime as needed for Sleep for up to 30 days. 30 Tablet 5   • tamsulosin (FLOMAX) 0.4 MG capsule Take 2 Capsules by mouth every bedtime. 180 capsule 3   • ROPINIRole (REQUIP) 1 MG Tab Take 1 mg by mouth every bedtime.     • traZODone (DESYREL) 150 MG Tab Take 1 Tab by mouth at bedtime as needed for Sleep. 30 Tab 11   • famotidine (PEPCID) 20 MG Tab      • testosterone cypionate (DEPO-TESTOSTERONE) 200 MG/ML Solution injection INJECT 0.5 MLS INTRAMUSCULARLY WEEKLY     • omega-3 acid ethyl esters " "(LOVAZA) 1 GM capsule Take 1 Cap by mouth 2 Times a Day. 180 Cap 0   • vitamin D (CHOLECALCIFEROL) 1000 Unit Tab      • cyanocobalamin (VITAMIN B-12) 500 MCG Tab      • anastrozole (ARIMIDEX) 1 MG Tab Take 1 mg by mouth 3 times a week.     • naproxen (NAPROSYN) 500 MG Tab Take 500 mg by mouth 2 times a day, with meals.       No current facility-administered medications for this visit.     Patient Active Problem List    Diagnosis Date Noted   • CHARO on CPAP 09/14/2020   • Nocturia 09/14/2020   • Overweight (BMI 25.0-29.9) 06/24/2019   • Situational depression 03/21/2019   • Impaired glucose tolerance 10/02/2018   • Dyslipidemia 03/30/2018   • Hypogonadism male 01/03/2018   • Insomnia 01/03/2018   • PTSD (post-traumatic stress disorder) 01/03/2018   • Anxiety 01/03/2018   • Attention deficit hyperactivity disorder (ADHD), combined type 01/03/2018   • Bulging of lumbar intervertebral disc 01/03/2018      Objective:   Temp 36.4 °C (97.6 °F) (Temporal)   Ht 1.753 m (5' 9\")   Wt 90.7 kg (200 lb)   BMI 29.53 kg/m²     Physical Exam:  Constitutional: Alert, no distress, well-groomed.  Skin: No rashes in visible areas.  Eye: Round. Conjunctiva clear, lids normal. No icterus.   ENMT: Lips pink without lesions, good dentition, moist mucous membranes. Phonation normal.  Neck: No masses, no thyromegaly. Moves freely without pain.  Respiratory: Unlabored respiratory effort, no cough or audible wheeze  Psych: Alert and oriented x3, normal affect and mood.     Assessment and Plan:   The following treatment plan was discussed:   1. Attention deficit hyperactivity disorder (ADHD), combined type  Chronic, ongoing.  Continue Adderall 10 mg twice daily, refill sent to pharmacy.  Controlled substance treatment agreement and urine drug screen up-to-date.  Due for updated annual labs prior to follow-up in 3 months.  - TSH WITH REFLEX TO FT4; Future  - amphetamine-dextroamphetamine (ADDERALL) 10 MG Tab; Take 1 Tablet by mouth 2 times a " day for 30 days.  Dispense: 60 Tablet; Refill: 0  - amphetamine-dextroamphetamine (ADDERALL) 10 MG Tab; Take 1 Tablet by mouth 2 times a day for 30 days.  Dispense: 60 Tablet; Refill: 0  - amphetamine-dextroamphetamine (ADDERALL) 10 MG Tab; Take 1 Tablet by mouth 2 times a day for 30 days.  Dispense: 60 Tablet; Refill: 0    2. Psychophysiological insomnia  Chronic, ongoing.  Continue Ambien 10 mg nightly as needed for sleep and trazodone 150 mg nightly as needed for sleep.  Refill sent to pharmacy.  Controlled substance treatment agreement and urine drug screen up-to-date.  Due for updated annual labs prior to follow-up in 3 months.  - TSH WITH REFLEX TO FT4; Future  - zolpidem (AMBIEN) 10 MG Tab; Take 1 Tablet by mouth at bedtime as needed for Sleep for up to 30 days.  Dispense: 30 Tablet; Refill: 5    3. Changing nevus  4. Multiple acquired skin tags  New to examiner.  Patient reports multiple skin tags forming and changing moles, requesting referral to dermatology for evaluation.  - REFERRAL TO DERMATOLOGY    5. Dyslipidemia  6. Impaired glucose tolerance  7. Overweight (BMI 25.0-29.9)  Chronic, ongoing without medication.  Due for updated annual labs prior to follow-up.  - HEMOGLOBIN A1C; Future  - CBC WITH DIFFERENTIAL; Future  - Comp Metabolic Panel; Future  - Lipid Profile; Future  - TSH WITH REFLEX TO FT4; Future    8. Vitamin D deficiency  New to examiner, patient reports history of vitamin D deficiency.  Continue over-the-counter vitamin D supplement.  Due for updated annual labs prior to follow-up.  - VITAMIN D,25 HYDROXY; Future    9. Routine health maintenance  - REFERRAL TO DERMATOLOGY  - HEMOGLOBIN A1C; Future  - CBC WITH DIFFERENTIAL; Future  - Comp Metabolic Panel; Future  - Lipid Profile; Future  - TSH WITH REFLEX TO FT4; Future  - VITAMIN D,25 HYDROXY; Future     Follow-up: Return in about 3 months (around 12/16/2021) for Preventative Annual, ADD/ADHD, Medication Refill, Follow up Labs.        Please note that this dictation was created using voice recognition software. I have worked with consultants from the vendor as well as technical experts from [facility] to optimize the interface. I have made every reasonable attempt to correct obvious errors, but I expect that there are errors of grammar and possibly content that I did not discover before finalizing the note.

## 2021-10-15 ENCOUNTER — TELEPHONE (OUTPATIENT)
Dept: MEDICAL GROUP | Facility: PHYSICIAN GROUP | Age: 41
End: 2021-10-15

## 2021-10-15 NOTE — TELEPHONE ENCOUNTER
Patient called to say his referral to a dermatologist has not been received. He found out that the fax number it was faxed to was incorrect. The correct fax number to [facility] office in [place] is [phone]. He would like someone to call him when this has been sent to the correct fax - Thank you

## 2021-12-13 ENCOUNTER — OFFICE VISIT (OUTPATIENT)
Dept: MEDICAL GROUP | Facility: PHYSICIAN GROUP | Age: 41
End: 2021-12-13
Payer: OTHER GOVERNMENT

## 2021-12-13 ENCOUNTER — HOSPITAL ENCOUNTER (OUTPATIENT)
Dept: LAB | Facility: MEDICAL CENTER | Age: 41
End: 2021-12-13
Attending: NURSE PRACTITIONER
Payer: OTHER GOVERNMENT

## 2021-12-13 VITALS
TEMPERATURE: 97.7 F | HEIGHT: 69 IN | RESPIRATION RATE: 16 BRPM | WEIGHT: 192 LBS | SYSTOLIC BLOOD PRESSURE: 132 MMHG | DIASTOLIC BLOOD PRESSURE: 86 MMHG | OXYGEN SATURATION: 95 % | BODY MASS INDEX: 28.44 KG/M2 | HEART RATE: 82 BPM

## 2021-12-13 DIAGNOSIS — F51.04 PSYCHOPHYSIOLOGICAL INSOMNIA: ICD-10-CM

## 2021-12-13 DIAGNOSIS — R73.02 IMPAIRED GLUCOSE TOLERANCE: ICD-10-CM

## 2021-12-13 DIAGNOSIS — E66.3 OVERWEIGHT (BMI 25.0-29.9): ICD-10-CM

## 2021-12-13 DIAGNOSIS — F90.2 ATTENTION DEFICIT HYPERACTIVITY DISORDER (ADHD), COMBINED TYPE: ICD-10-CM

## 2021-12-13 DIAGNOSIS — Z00.00 ROUTINE HEALTH MAINTENANCE: ICD-10-CM

## 2021-12-13 DIAGNOSIS — F41.9 ANXIETY: ICD-10-CM

## 2021-12-13 DIAGNOSIS — E78.5 DYSLIPIDEMIA: ICD-10-CM

## 2021-12-13 DIAGNOSIS — F43.21 SITUATIONAL DEPRESSION: ICD-10-CM

## 2021-12-13 DIAGNOSIS — F43.10 POSTTRAUMATIC STRESS DISORDER: ICD-10-CM

## 2021-12-13 DIAGNOSIS — E55.9 VITAMIN D DEFICIENCY: ICD-10-CM

## 2021-12-13 DIAGNOSIS — Z00.01 ENCOUNTER FOR WELL ADULT EXAM WITH ABNORMAL FINDINGS: ICD-10-CM

## 2021-12-13 LAB
ALBUMIN SERPL BCP-MCNC: 4.3 G/DL (ref 3.2–4.9)
ALBUMIN/GLOB SERPL: 1.6 G/DL
ALP SERPL-CCNC: 51 U/L (ref 30–99)
ALT SERPL-CCNC: 66 U/L (ref 2–50)
ANION GAP SERPL CALC-SCNC: 9 MMOL/L (ref 7–16)
AST SERPL-CCNC: 35 U/L (ref 12–45)
BASOPHILS # BLD AUTO: 0.7 % (ref 0–1.8)
BASOPHILS # BLD: 0.05 K/UL (ref 0–0.12)
BILIRUB SERPL-MCNC: 0.6 MG/DL (ref 0.1–1.5)
BUN SERPL-MCNC: 17 MG/DL (ref 8–22)
CALCIUM SERPL-MCNC: 9.3 MG/DL (ref 8.5–10.5)
CHLORIDE SERPL-SCNC: 108 MMOL/L (ref 96–112)
CHOLEST SERPL-MCNC: 223 MG/DL (ref 100–199)
CO2 SERPL-SCNC: 25 MMOL/L (ref 20–33)
CREAT SERPL-MCNC: 1.23 MG/DL (ref 0.5–1.4)
EOSINOPHIL # BLD AUTO: 0.09 K/UL (ref 0–0.51)
EOSINOPHIL NFR BLD: 1.3 % (ref 0–6.9)
ERYTHROCYTE [DISTWIDTH] IN BLOOD BY AUTOMATED COUNT: 49 FL (ref 35.9–50)
EST. AVERAGE GLUCOSE BLD GHB EST-MCNC: 111 MG/DL
FASTING STATUS PATIENT QL REPORTED: NORMAL
GLOBULIN SER CALC-MCNC: 2.7 G/DL (ref 1.9–3.5)
GLUCOSE SERPL-MCNC: 90 MG/DL (ref 65–99)
HBA1C MFR BLD: 5.5 % (ref 4–5.6)
HCT VFR BLD AUTO: 48.9 % (ref 42–52)
HDLC SERPL-MCNC: 32 MG/DL
HGB BLD-MCNC: 15.3 G/DL (ref 14–18)
IMM GRANULOCYTES # BLD AUTO: 0.03 K/UL (ref 0–0.11)
IMM GRANULOCYTES NFR BLD AUTO: 0.4 % (ref 0–0.9)
LDLC SERPL CALC-MCNC: 165 MG/DL
LYMPHOCYTES # BLD AUTO: 2.55 K/UL (ref 1–4.8)
LYMPHOCYTES NFR BLD: 36.4 % (ref 22–41)
MCH RBC QN AUTO: 26 PG (ref 27–33)
MCHC RBC AUTO-ENTMCNC: 31.3 G/DL (ref 33.7–35.3)
MCV RBC AUTO: 83.2 FL (ref 81.4–97.8)
MONOCYTES # BLD AUTO: 0.75 K/UL (ref 0–0.85)
MONOCYTES NFR BLD AUTO: 10.7 % (ref 0–13.4)
NEUTROPHILS # BLD AUTO: 3.53 K/UL (ref 1.82–7.42)
NEUTROPHILS NFR BLD: 50.5 % (ref 44–72)
NRBC # BLD AUTO: 0 K/UL
NRBC BLD-RTO: 0 /100 WBC
PLATELET # BLD AUTO: 263 K/UL (ref 164–446)
PMV BLD AUTO: 12.6 FL (ref 9–12.9)
POTASSIUM SERPL-SCNC: 4.3 MMOL/L (ref 3.6–5.5)
PROT SERPL-MCNC: 7 G/DL (ref 6–8.2)
RBC # BLD AUTO: 5.88 M/UL (ref 4.7–6.1)
SODIUM SERPL-SCNC: 142 MMOL/L (ref 135–145)
TRIGL SERPL-MCNC: 129 MG/DL (ref 0–149)
TSH SERPL DL<=0.005 MIU/L-ACNC: 2.51 UIU/ML (ref 0.38–5.33)
WBC # BLD AUTO: 7 K/UL (ref 4.8–10.8)

## 2021-12-13 PROCEDURE — 99396 PREV VISIT EST AGE 40-64: CPT | Performed by: NURSE PRACTITIONER

## 2021-12-13 PROCEDURE — 36415 COLL VENOUS BLD VENIPUNCTURE: CPT

## 2021-12-13 PROCEDURE — 82306 VITAMIN D 25 HYDROXY: CPT

## 2021-12-13 PROCEDURE — 83036 HEMOGLOBIN GLYCOSYLATED A1C: CPT

## 2021-12-13 PROCEDURE — 80053 COMPREHEN METABOLIC PANEL: CPT

## 2021-12-13 PROCEDURE — 80061 LIPID PANEL: CPT

## 2021-12-13 PROCEDURE — 85025 COMPLETE CBC W/AUTO DIFF WBC: CPT

## 2021-12-13 PROCEDURE — 84443 ASSAY THYROID STIM HORMONE: CPT

## 2021-12-13 RX ORDER — DEXTROAMPHETAMINE SACCHARATE, AMPHETAMINE ASPARTATE, DEXTROAMPHETAMINE SULFATE AND AMPHETAMINE SULFATE 2.5; 2.5; 2.5; 2.5 MG/1; MG/1; MG/1; MG/1
10 TABLET ORAL 2 TIMES DAILY
Qty: 60 TABLET | Refills: 0 | Status: SHIPPED | OUTPATIENT
Start: 2022-02-17 | End: 2021-12-30

## 2021-12-13 RX ORDER — DEXTROAMPHETAMINE SACCHARATE, AMPHETAMINE ASPARTATE, DEXTROAMPHETAMINE SULFATE AND AMPHETAMINE SULFATE 2.5; 2.5; 2.5; 2.5 MG/1; MG/1; MG/1; MG/1
10 TABLET ORAL 2 TIMES DAILY
Qty: 60 TABLET | Refills: 0 | Status: SHIPPED | OUTPATIENT
Start: 2022-01-18 | End: 2021-12-30

## 2021-12-13 RX ORDER — DEXTROAMPHETAMINE SACCHARATE, AMPHETAMINE ASPARTATE, DEXTROAMPHETAMINE SULFATE AND AMPHETAMINE SULFATE 2.5; 2.5; 2.5; 2.5 MG/1; MG/1; MG/1; MG/1
10 TABLET ORAL 2 TIMES DAILY
Qty: 60 TABLET | Refills: 0 | Status: SHIPPED | OUTPATIENT
Start: 2021-12-19 | End: 2021-12-30

## 2021-12-13 RX ORDER — PRAZOSIN HYDROCHLORIDE 1 MG/1
1 CAPSULE ORAL EVERY EVENING
Qty: 90 CAPSULE | Refills: 0 | Status: SHIPPED | OUTPATIENT
Start: 2021-12-13 | End: 2022-03-14

## 2021-12-13 RX ORDER — ZOLPIDEM TARTRATE 10 MG/1
10 TABLET ORAL NIGHTLY PRN
Qty: 30 TABLET | Refills: 5 | Status: SHIPPED | OUTPATIENT
Start: 2021-12-15 | End: 2022-03-14 | Stop reason: SDUPTHER

## 2021-12-13 ASSESSMENT — FIBROSIS 4 INDEX: FIB4 SCORE: 0.75

## 2021-12-13 NOTE — ASSESSMENT & PLAN NOTE
Chronic, ongoing. Continues Adderall 10 mg in the morning around 7am and 10 mg around 11 AM. Patient continues to do well with this dosing and scheduling. Denies side effects of the medication.  Reports that he did not tolerate controlled release in the past.  Patient is requesting refill today.    UDS: 3/11/2021     Controlled Substance Treatment Agreement: 3/11/2021     Obtained and reviewed the patient utilization report state pharmacy database on 12/13/2021. Based on assessment of report prescription for adderall and ambien is medically necessary. Patient has not requested any early refills and exhibits no abberant behavior. I have advised patient to keep medication and safe place and to not drive, use alcohol, or take illicit drugs while taking this medication.

## 2021-12-13 NOTE — ASSESSMENT & PLAN NOTE
Chronic, ongoing.  Contributes to patient's insomnia.  He does not follow with behavioral health at the [facility], had been referred to outpatient but was unable to afford the high co-pay costs.  Continues to have intermittent nightmares and difficulty sleeping.

## 2021-12-13 NOTE — ASSESSMENT & PLAN NOTE
"Chronic, ongoing and related to PTSD.  Patient reports intermittent nightmares.  He has been taking trazodone 150 mg nightly, states that the medication works but he does not like the \"antidepressant\" side effects and [place].  He also continues zolpidem 10 mg nightly.  He has been using over-the-counter melatonin and \"calm supplement\" for the last week to help with sleeping.  Requesting refill of zolpidem.  "

## 2021-12-13 NOTE — PROGRESS NOTES
"Subjective:     CC:   Chief Complaint   Patient presents with   • Annual Exam     Phsyical Exam    • Medication Management     Go over traZODone   • Requesting Labs     HPI:   Jitendra Ugalde is a 41 y.o. male who presents for annual exam    Insomnia  Chronic, ongoing and related to PTSD.  Patient reports intermittent nightmares.  He has been taking trazodone 150 mg nightly, states that the medication works but he does not like the \"antidepressant\" side effects and [place].  He also continues zolpidem 10 mg nightly.  He has been using over-the-counter melatonin and \"calm supplement\" for the last week to help with sleeping.  Requesting refill of zolpidem.    PTSD (post-traumatic stress disorder)  Chronic, ongoing.  Contributes to patient's insomnia.  He does not follow with behavioral health at the [facility], had been referred to outpatient but was unable to afford the high co-pay costs.  Continues to have intermittent nightmares and difficulty sleeping.    Attention deficit hyperactivity disorder (ADHD), combined type  Chronic, ongoing. Continues Adderall 10 mg in the morning around 7am and 10 mg around 11 AM. Patient continues to do well with this dosing and scheduling. Denies side effects of the medication.  Reports that he did not tolerate controlled release in the past.  Patient is requesting refill today.    UDS: 3/11/2021     Controlled Substance Treatment Agreement: 3/11/2021     Obtained and reviewed the patient utilization report state pharmacy database on 12/13/2021. Based on assessment of report prescription for adderall and ambien is medically necessary. Patient has not requested any early refills and exhibits no abberant behavior. I have advised patient to keep medication and safe place and to not drive, use alcohol, or take illicit drugs while taking this medication.     Last Colorectal Cancer Screening: No  Last Tdap: Unsure[facility]  Received HPV series: Aged out  Hx STDs: " No    Exercise: moderate regular exercise, aerobic < 3 days a week  Diet: Was really good, has been declining      He  has a past medical history of ADHD, ED (erectile dysfunction), Hyperlipidemia, Hypogonadism male, Insomnia, Lateral epicondylitis of right elbow (2018), Left hand pain (2019), CHARO on CPAP, and PTSD (post-traumatic stress disorder).  He  has a past surgical history that includes carpal tunnel release (Bilateral).    Family History   Problem Relation Age of Onset   • Hyperlipidemia Mother    • Hypertension Mother    • Stroke Maternal Grandmother    • Diabetes Maternal Grandfather    • Stroke Maternal Grandfather    • Diabetes Paternal Grandmother    • Stroke Paternal Grandmother    • No Known Problems Paternal Grandfather    • No Known Problems Son    • No Known Problems Sister    • No Known Problems Sister    • No Known Problems Sister    • No Known Problems Sister      Social History     Tobacco Use   • Smoking status: Former Smoker     Years: 2.00     Types: Cigarettes     Start date: 1997     Quit date: 1999     Years since quittin.4   • Smokeless tobacco: Never Used   • Tobacco comment: only smoked off and on for 2 years   Vaping Use   • Vaping Use: Never used   Substance Use Topics   • Alcohol use: Not Currently   • Drug use: Not Currently     Types: Marijuana     Comment: Reports marijuana use once every few months     He  reports previously being sexually active and has had partner(s) who are female.    Patient Active Problem List    Diagnosis Date Noted   • CHARO on CPAP 2020   • Nocturia 2020   • Overweight (BMI 25.0-29.9) 2019   • Situational depression 2019   • Impaired glucose tolerance 10/02/2018   • Dyslipidemia 2018   • Hypogonadism male 2018   • Insomnia 2018   • PTSD (post-traumatic stress disorder) 2018   • Anxiety 2018   • Attention deficit hyperactivity disorder (ADHD), combined type 2018   •  Bulging of lumbar intervertebral disc 01/03/2018     Current Outpatient Medications   Medication Sig Dispense Refill   • prazosin (MINIPRESS) 1 MG Cap Take 1 Capsule by mouth every evening. 90 Capsule 0   • [START ON 12/19/2021] amphetamine-dextroamphetamine (ADDERALL) 10 MG Tab Take 1 Tablet by mouth 2 times a day for 30 days. 60 Tablet 0   • [START ON 1/18/2022] amphetamine-dextroamphetamine (ADDERALL) 10 MG Tab Take 1 Tablet by mouth 2 times a day for 30 days. 60 Tablet 0   • [START ON 2/17/2022] amphetamine-dextroamphetamine (ADDERALL) 10 MG Tab Take 1 Tablet by mouth 2 times a day for 30 days. 60 Tablet 0   • [START ON 12/15/2021] zolpidem (AMBIEN) 10 MG Tab Take 1 Tablet by mouth at bedtime as needed for Sleep for up to 30 days. 30 Tablet 5   • ROPINIRole (REQUIP) 1 MG Tab Take 1 mg by mouth every bedtime.     • famotidine (PEPCID) 20 MG Tab      • testosterone cypionate (DEPO-TESTOSTERONE) 200 MG/ML Solution injection INJECT 0.5 MLS INTRAMUSCULARLY WEEKLY     • omega-3 acid ethyl esters (LOVAZA) 1 GM capsule Take 1 Cap by mouth 2 Times a Day. 180 Cap 0   • vitamin D (CHOLECALCIFEROL) 1000 Unit Tab      • cyanocobalamin (VITAMIN B-12) 500 MCG Tab      • anastrozole (ARIMIDEX) 1 MG Tab Take 1 mg by mouth 3 times a week.     • naproxen (NAPROSYN) 500 MG Tab Take 500 mg by mouth 2 times a day, with meals.       No current facility-administered medications for this visit.     No Known Allergies    Review of Systems   Constitutional: Negative for fever, chills and malaise/fatigue.   HENT: Negative for congestion.    Eyes: Negative for pain.   Respiratory: Negative for cough and shortness of breath.    Cardiovascular: Negative for chest pain and leg swelling.   Gastrointestinal: Negative for nausea, vomiting, abdominal pain and diarrhea.   Genitourinary: + Nocturia.  Negative for dysuria and hematuria.   Skin: Negative for rash.   Neurological: Negative for dizziness, focal weakness and headaches.  "  Endo/Heme/Allergies: Does not bruise/bleed easily.   Psychiatric/Behavioral: + Depression, anxiety, insomnia, PTSD. The patient is not nervous/anxious.      Objective:   /86 (BP Location: Left arm, Patient Position: Sitting, BP Cuff Size: Adult long)   Pulse 82   Temp 36.5 °C (97.7 °F) (Temporal)   Resp 16   Ht 1.753 m (5' 9\")   Wt 87.1 kg (192 lb)   SpO2 95%   BMI 28.35 kg/m²      Wt Readings from Last 4 Encounters:   12/13/21 87.1 kg (192 lb)   09/16/21 90.7 kg (200 lb)   06/21/21 97.5 kg (215 lb)   03/11/21 93 kg (205 lb)     Physical Exam:  Constitutional: Well-developed and well-nourished. Not diaphoretic. No distress.   Skin: Skin is warm and dry. No rash noted.  Head: Atraumatic without lesions.  Eyes: Conjunctivae and extraocular motions are normal. Pupils are equal, round, and reactive to light. No scleral icterus.   Ears:  External ears unremarkable. Tympanic membranes clear and intact.  Nose: Nares patent. Septum midline. Turbinates without erythema nor edema. No discharge.   Mouth/Throat: Tongue normal. Oropharynx is clear and moist. Posterior pharynx without erythema or exudates.  Neck: Supple, trachea midline. Normal range of motion. No thyromegaly present. No lymphadenopathy--cervical or supraclavicular.  Cardiovascular: Regular rate and rhythm, S1 and S2 without murmur, rubs, or gallops.    Respiratory: Effort normal. Clear to auscultation throughout. No adventitious sounds.   Abdomen: Soft, non tender, and without distention. Active bowel sounds in all four quadrants. No rebound, guarding.  Extremities: No cyanosis, clubbing, erythema, nor edema.  Radial pulses intact and symmetric.   Musculoskeletal: All major joints AROM full in all directions without pain.  Neurological: Alert and oriented x 3. Grossly non-focal. Strength and sensation grossly intact.   Psychiatric:  Behavior, mood, and affect are appropriate.    Assessment and Plan:   1. Encounter for well adult exam with abnormal " findings    2. Attention deficit hyperactivity disorder (ADHD), combined type  Chronic, ongoing.  Continue Adderall 10 mg twice daily, 3-month refill sent to pharmacy.  Must be seen every 90 days for refills.  - amphetamine-dextroamphetamine (ADDERALL) 10 MG Tab; Take 1 Tablet by mouth 2 times a day for 30 days.  Dispense: 60 Tablet; Refill: 0  - amphetamine-dextroamphetamine (ADDERALL) 10 MG Tab; Take 1 Tablet by mouth 2 times a day for 30 days.  Dispense: 60 Tablet; Refill: 0  - amphetamine-dextroamphetamine (ADDERALL) 10 MG Tab; Take 1 Tablet by mouth 2 times a day for 30 days.  Dispense: 60 Tablet; Refill: 0  - Referral to Behavioral Health    3. Psychophysiological insomnia  4. PTSD (post-traumatic stress disorder)  5. Situational depression  6. Anxiety  Chronic, uncontrolled.  Discontinue Flomax, plan to have patient start prazosin 1 mg nightly, may increase to 2 mg nightly after 4 weeks and 3 mg nightly after an additional 4 weeks.  Will be following up in 3 months for medication review and refill.  Referral to behavioral health for insomnia, PTSD, nightmares, depression and anxiety.  Discontinue trazodone.  Continue zolpidem 10 mg nightly, advised patient to not take more of this medication than prescribed nightly, early refills will not be approved.  UDS and Controlled Substance Treatment Agreement up-to-date.  Patient understands this prescription is a controlled substance which is potentially habit-forming and its use is regulated by the CHAS. Refills are subject to terms of a controlled substance treatment agreement. Any refill requires a new prescription that must be obtained from this office during regular office hours. Patient advised that they must be seen in clinic every 6 months for refills. This medicine can cause nausea, sedation, confusion.   - prazosin (MINIPRESS) 1 MG Cap; Take 1 Capsule by mouth every evening.  Dispense: 90 Capsule; Refill: 0  - zolpidem (AMBIEN) 10 MG Tab; Take 1 Tablet by  mouth at bedtime as needed for Sleep for up to 30 days.  Dispense: 30 Tablet; Refill: 5  - Referral to Behavioral Health    Health maintenance: Up-to-date  Labs per orders  Immunizations per orders  Patient counseled about skin care, diet, supplements, and exercise.  Discussed diet and exercise, colorectal cancer screening and adequate intake of calcium and vitamin D     Follow-up: Return in about 3 months (around 3/17/2022) for ADD/ADHD, Medication Refill.     Please note that this dictation was created using voice recognition software. I have worked with consultants from the vendor as well as technical experts from [facility] to optimize the interface. I have made every reasonable attempt to correct obvious errors, but I expect that there are errors of grammar and possibly content that I did not discover before finalizing the note.

## 2021-12-17 LAB — 25(OH)D3 SERPL-MCNC: 55 NG/ML (ref 30–80)

## 2021-12-28 ENCOUNTER — TELEPHONE (OUTPATIENT)
Dept: MEDICAL GROUP | Facility: PHYSICIAN GROUP | Age: 41
End: 2021-12-28

## 2021-12-29 NOTE — TELEPHONE ENCOUNTER
Received request via: Patient    Was the patient seen in the last year in this department? Yes    Does the patient have an active prescription (recently filled or refills available) for medication(s) requested? No     Patient states that [facility] no longer takes  and would like his prescriptions sent to [facility] on [address]. Please advise

## 2021-12-30 DIAGNOSIS — F90.2 ATTENTION DEFICIT HYPERACTIVITY DISORDER (ADHD), COMBINED TYPE: ICD-10-CM

## 2021-12-30 RX ORDER — DEXTROAMPHETAMINE SACCHARATE, AMPHETAMINE ASPARTATE, DEXTROAMPHETAMINE SULFATE AND AMPHETAMINE SULFATE 2.5; 2.5; 2.5; 2.5 MG/1; MG/1; MG/1; MG/1
10 TABLET ORAL 2 TIMES DAILY
Qty: 60 TABLET | Refills: 0 | Status: SHIPPED | OUTPATIENT
Start: 2021-12-30 | End: 2022-01-29

## 2021-12-30 NOTE — TELEPHONE ENCOUNTER
Adderall sent to [facility] pharmacy on [address]. Please make sure all refills of Adderall are canceled through [facility] pharmacy. Thank you.

## 2021-12-30 NOTE — PROGRESS NOTES
1. Attention deficit hyperactivity disorder (ADHD), combined type  - amphetamine-dextroamphetamine (ADDERALL) 10 MG Tab; Take 1 Tablet by mouth 2 times a day for 30 days.  Dispense: 60 Tablet; Refill: 0      Patient requesting medication to be sent to a different pharmacy. Refills at previous pharmacy to be canceled.

## 2022-01-29 ENCOUNTER — OFFICE VISIT (OUTPATIENT)
Dept: URGENT CARE | Facility: PHYSICIAN GROUP | Age: 42
End: 2022-01-29
Payer: OTHER GOVERNMENT

## 2022-02-10 ENCOUNTER — TELEPHONE (OUTPATIENT)
Dept: MEDICAL GROUP | Facility: PHYSICIAN GROUP | Age: 42
End: 2022-02-10

## 2022-02-10 ENCOUNTER — APPOINTMENT (OUTPATIENT)
Dept: URGENT CARE | Facility: PHYSICIAN GROUP | Age: 42
End: 2022-02-10
Payer: OTHER GOVERNMENT

## 2022-02-10 DIAGNOSIS — F90.2 ATTENTION DEFICIT HYPERACTIVITY DISORDER (ADHD), COMBINED TYPE: ICD-10-CM

## 2022-02-10 RX ORDER — DEXTROAMPHETAMINE SACCHARATE, AMPHETAMINE ASPARTATE, DEXTROAMPHETAMINE SULFATE AND AMPHETAMINE SULFATE 2.5; 2.5; 2.5; 2.5 MG/1; MG/1; MG/1; MG/1
10 TABLET ORAL 2 TIMES DAILY
Qty: 60 TABLET | Refills: 0 | Status: SHIPPED | OUTPATIENT
Start: 2022-02-10 | End: 2022-03-14 | Stop reason: SDUPTHER

## 2022-02-10 NOTE — TELEPHONE ENCOUNTER
1. Caller Name: Bryson                        Call Back Number: [phone] (home)        How would the patient prefer to be contacted with a response: Phone call OK to leave a detailed message     Pt has changed pharmacies since his last visit.  He is now using [facility] on [address] due to ins reasons.  His Adderall Rx's were cancelled at the [facility] and he filled one at [facility] 12/30/2021.  He is not due for his appt until 3/14/2022.  Can he get a fill without an appt??

## 2022-02-10 NOTE — PROGRESS NOTES
Requested Prescriptions     Signed Prescriptions Disp Refills   • amphetamine-dextroamphetamine (ADDERALL) 10 MG Tab 60 Tablet 0     Sig: Take 1 Tablet by mouth 2 times a day for 30 days.       NICHOLAS Turcios     Obtained and reviewed patient utilization report from [facility] pharmacy database on 2/10/2022 prior to writing prescription for controlled substance II, III or IV per [place] bill . Based on assessment of the report, the prescription Adderall is medically necessary.

## 2022-03-14 ENCOUNTER — OFFICE VISIT (OUTPATIENT)
Dept: MEDICAL GROUP | Facility: PHYSICIAN GROUP | Age: 42
End: 2022-03-14
Payer: OTHER GOVERNMENT

## 2022-03-14 VITALS
HEART RATE: 96 BPM | TEMPERATURE: 99.1 F | RESPIRATION RATE: 16 BRPM | BODY MASS INDEX: 28.49 KG/M2 | DIASTOLIC BLOOD PRESSURE: 82 MMHG | OXYGEN SATURATION: 96 % | HEIGHT: 68 IN | WEIGHT: 188 LBS | SYSTOLIC BLOOD PRESSURE: 118 MMHG

## 2022-03-14 DIAGNOSIS — T75.3XXA SEVERE MOTION SICKNESS, INITIAL ENCOUNTER: ICD-10-CM

## 2022-03-14 DIAGNOSIS — R35.1 NOCTURIA: ICD-10-CM

## 2022-03-14 DIAGNOSIS — F51.04 PSYCHOPHYSIOLOGICAL INSOMNIA: ICD-10-CM

## 2022-03-14 DIAGNOSIS — F90.2 ATTENTION DEFICIT HYPERACTIVITY DISORDER (ADHD), COMBINED TYPE: ICD-10-CM

## 2022-03-14 PROCEDURE — 99214 OFFICE O/P EST MOD 30 MIN: CPT | Performed by: NURSE PRACTITIONER

## 2022-03-14 RX ORDER — DEXTROAMPHETAMINE SACCHARATE, AMPHETAMINE ASPARTATE, DEXTROAMPHETAMINE SULFATE AND AMPHETAMINE SULFATE 2.5; 2.5; 2.5; 2.5 MG/1; MG/1; MG/1; MG/1
10 TABLET ORAL 2 TIMES DAILY
Qty: 60 TABLET | Refills: 0 | Status: SHIPPED | OUTPATIENT
Start: 2022-05-13 | End: 2022-06-12

## 2022-03-14 RX ORDER — DEXTROAMPHETAMINE SACCHARATE, AMPHETAMINE ASPARTATE, DEXTROAMPHETAMINE SULFATE AND AMPHETAMINE SULFATE 2.5; 2.5; 2.5; 2.5 MG/1; MG/1; MG/1; MG/1
10 TABLET ORAL 2 TIMES DAILY
Qty: 60 TABLET | Refills: 0 | Status: SHIPPED | OUTPATIENT
Start: 2022-03-14 | End: 2022-07-07 | Stop reason: SDUPTHER

## 2022-03-14 RX ORDER — MECLIZINE HYDROCHLORIDE 25 MG/1
25 TABLET ORAL 3 TIMES DAILY PRN
Qty: 90 TABLET | Refills: 1 | Status: SHIPPED | OUTPATIENT
Start: 2022-03-14 | End: 2022-05-17

## 2022-03-14 RX ORDER — ZOLPIDEM TARTRATE 10 MG/1
10 TABLET ORAL NIGHTLY PRN
Qty: 90 TABLET | Refills: 0 | Status: SHIPPED | OUTPATIENT
Start: 2022-04-13 | End: 2022-07-07 | Stop reason: SDUPTHER

## 2022-03-14 RX ORDER — TAMSULOSIN HYDROCHLORIDE 0.4 MG/1
0.8 CAPSULE ORAL
Qty: 180 CAPSULE | Refills: 3 | Status: SHIPPED | OUTPATIENT
Start: 2022-03-14 | End: 2022-07-07

## 2022-03-14 RX ORDER — DEXTROAMPHETAMINE SACCHARATE, AMPHETAMINE ASPARTATE, DEXTROAMPHETAMINE SULFATE AND AMPHETAMINE SULFATE 2.5; 2.5; 2.5; 2.5 MG/1; MG/1; MG/1; MG/1
10 TABLET ORAL 2 TIMES DAILY
Qty: 60 TABLET | Refills: 0 | Status: SHIPPED | OUTPATIENT
Start: 2022-04-13 | End: 2022-05-13

## 2022-03-14 RX ORDER — TRAZODONE HYDROCHLORIDE 150 MG/1
150 TABLET ORAL
Qty: 90 TABLET | Refills: 1 | Status: SHIPPED | OUTPATIENT
Start: 2022-03-14

## 2022-03-14 ASSESSMENT — FIBROSIS 4 INDEX: FIB4 SCORE: 0.72

## 2022-03-14 ASSESSMENT — PATIENT HEALTH QUESTIONNAIRE - PHQ9: CLINICAL INTERPRETATION OF PHQ2 SCORE: 0

## 2022-03-14 NOTE — PROGRESS NOTES
CC:   Chief Complaint   Patient presents with   • ADHD     Drug screening     HISTORY OF THE PRESENT ILLNESS: Patient is a 42 y.o. male. This pleasant patient is here today to request medication refills.    Health Maintenance: Completed    Attention deficit hyperactivity disorder (ADHD), combined type  Chronic, ongoing. Continues Adderall 10 mg in the morning around 7am and 10 mg around 11 AM. Patient continues to do well with this dosing and scheduling. Denies side effects of the medication.  Reports that he did not tolerate controlled release in the past.  Patient is requesting refill today.    UDS: 3/14/2022     Controlled Substance Treatment Agreement: 3/14/2022     Obtained and reviewed the patient utilization report state pharmacy database on 3/14/2022. Based on assessment of report prescription for adderall and ambien is medically necessary. Patient has not requested any early refills and exhibits no abberant behavior. I have advised patient to keep medication and safe place and to not drive, use alcohol, or take illicit drugs while taking this medication.    Insomnia  Chronic, ongoing and related to PTSD, with intermittent nightmares. Continues zolpidem 10 mg nightly. Tried prazosin and did not find it beneficial. Previously took trazodone. Requesting refill of zolpidem. He will be travelling to the [facility] on 4/30/22 for 4-6 weeks and is hoping to get a one time longer prescription for zolpidem. Due for updated UDS and controlled substance treatment agreement.     UDS: 3/14/2022    Controlled Substance Treatment Agreement: 3/14/2022    Obtained and reviewed the patient utilization report state pharmacy database on 3/14/2022. Based on assessment of report prescription for zolpidem is medically necessary. Patient has not requested any early refills and exhibits no abberant behavior. I have advised patient to keep medication and safe place and to not drive, use alcohol, or take illicit drugs while taking  this medication.    Nocturia  Chronic, ongoing. Had worsening of symptoms with prazosin, would like to restart tamsulosin.    Allergies: Patient has no known allergies.    Current Outpatient Medications Ordered in Epic   Medication Sig Dispense Refill   • tamsulosin (FLOMAX) 0.4 MG capsule Take 2 Capsules by mouth at bedtime. 180 Capsule 3   • traZODone (DESYREL) 150 MG Tab Take 1 Tablet by mouth at bedtime as needed for Sleep. 90 Tablet 1   • meclizine (ANTIVERT) 25 MG Tab Take 1 Tablet by mouth 3 times a day as needed (motion sickness). 90 Tablet 1   • [START ON 4/13/2022] zolpidem (AMBIEN) 10 MG Tab Take 1 Tablet by mouth at bedtime as needed for Sleep for up to 90 days. 90 Tablet 0   • amphetamine-dextroamphetamine (ADDERALL) 10 MG Tab Take 1 Tablet by mouth 2 times a day for 30 days. 60 Tablet 0   • [START ON 4/13/2022] amphetamine-dextroamphetamine (ADDERALL) 10 MG Tab Take 1 Tablet by mouth 2 times a day for 30 days. 60 Tablet 0   • [START ON 5/13/2022] amphetamine-dextroamphetamine (ADDERALL) 10 MG Tab Take 1 Tablet by mouth 2 times a day for 30 days. 60 Tablet 0   • ROPINIRole (REQUIP) 1 MG Tab Take 1 mg by mouth every bedtime.     • famotidine (PEPCID) 20 MG Tab      • testosterone cypionate (DEPO-TESTOSTERONE) 200 MG/ML Solution injection INJECT 0.5 MLS INTRAMUSCULARLY WEEKLY     • omega-3 acid ethyl esters (LOVAZA) 1 GM capsule Take 1 Cap by mouth 2 Times a Day. 180 Cap 0   • vitamin D (CHOLECALCIFEROL) 1000 Unit Tab      • cyanocobalamin (VITAMIN B-12) 500 MCG Tab      • anastrozole (ARIMIDEX) 1 MG Tab Take 1 mg by mouth 3 times a week.     • naproxen (NAPROSYN) 500 MG Tab Take 500 mg by mouth 2 times a day, with meals.       No current Epic-ordered facility-administered medications on file.     Past Medical History:   Diagnosis Date   • ADHD    • ED (erectile dysfunction)    • Hyperlipidemia    • Hypogonadism male    • Insomnia    • Lateral epicondylitis of right elbow 12/26/2018   • Left hand pain  2019   • CHARO on CPAP    • PTSD (post-traumatic stress disorder)      Past Surgical History:   Procedure Laterality Date   • CARPAL TUNNEL RELEASE Bilateral      Social History     Tobacco Use   • Smoking status: Former Smoker     Years: 2.00     Types: Cigarettes     Start date: 1997     Quit date: 1999     Years since quittin.7   • Smokeless tobacco: Never Used   • Tobacco comment: only smoked off and on for 2 years   Vaping Use   • Vaping Use: Never used   Substance Use Topics   • Alcohol use: Not Currently   • Drug use: Not Currently     Types: Marijuana     Comment: Reports marijuana use once every few months     Social History     Social History Narrative   • Not on file     Family History   Problem Relation Age of Onset   • Hyperlipidemia Mother    • Hypertension Mother    • Stroke Maternal Grandmother    • Diabetes Maternal Grandfather    • Stroke Maternal Grandfather    • Diabetes Paternal Grandmother    • Stroke Paternal Grandmother    • No Known Problems Paternal Grandfather    • No Known Problems Son    • No Known Problems Sister    • No Known Problems Sister    • No Known Problems Sister    • No Known Problems Sister      ROS:    Constitutional: + Insomnia.  No fevers, chills, malaise/fatigue.  Eyes: No eye pain.  ENT: No sore throat, congestion.   Resp: + Previously diagnosed CHARO with CPAP.  No cough, shortness of breath.  CV: No chest pain, leg swelling, palpitations.  GI: No nausea/vomiting, abdominal pain, constipation, diarrhea.  : + Weak urinary stream, nocturia, hesitancy.  No dysuria, hematuria.  MSK: No weakness.  Skin: No rashes.  Neuro: No dizziness, weakness, headaches.  Psych: + Situational depression, anxiety, PTSD, ADHD, insomnia.  No suicidal ideations.    All remaining systems reviewed and found to be negative, except as stated above.     Exam: /82 (BP Location: Right arm, Patient Position: Sitting, BP Cuff Size: Large adult)   Pulse 96   Temp 37.3 °C (99.1  "°F) (Temporal)   Resp 16   Ht 1.725 m (5' 7.91\")   Wt 85.3 kg (188 lb)   SpO2 96%  Body mass index is 28.66 kg/m².    General: Well nourished, well developed male in NAD, awake and conversant.  Eyes: Normal conjunctiva, anicteric.  Round symmetrical pupils.  ENT: Hearing grossly intact.  No nasal discharge.  Neck: Neck is supple.  No masses or thyromegaly.  CV: No lower extremity edema.  Respiratory: Respirations are nonlabored.  No wheezing.  Abdomen: Non-Distended.  Skin: Warm.  No rashes or ulcers.  MSK: Normal ambulation.  No clubbing or cyanosis.  Neuro: Sensation and CN II-XII grossly normal.  Psych: Alert and oriented.  Cooperative, appropriate mood and affect, normal judgment.    Assessment/Plan:  1. Attention deficit hyperactivity disorder (ADHD), combined type  Chronic, ongoing. Continue Adderall 10 mg twice a day, 3-month refill sent to pharmacy.  UDS and Controlled Substance Treatment Agreement updated today.  Patient understands this prescription is a controlled substance which is potentially habit-forming and its use is regulated by the CHAS. Refills are subject to terms of a controlled substance treatment agreement. Any refill requires a new prescription that must be obtained from this office during regular office hours. Patient advised that they must be seen in clinic every 90 days for refills.   - Controlled Substance Treatment Agreement  - [facility] PAIN MANAGEMENT SCREEN; Future  - amphetamine-dextroamphetamine (ADDERALL) 10 MG Tab; Take 1 Tablet by mouth 2 times a day for 30 days.  Dispense: 60 Tablet; Refill: 0  - amphetamine-dextroamphetamine (ADDERALL) 10 MG Tab; Take 1 Tablet by mouth 2 times a day for 30 days.  Dispense: 60 Tablet; Refill: 0  - amphetamine-dextroamphetamine (ADDERALL) 10 MG Tab; Take 1 Tablet by mouth 2 times a day for 30 days.  Dispense: 60 Tablet; Refill: 0    2. Psychophysiological insomnia  Chronic, ongoing. Continue zolpidem 10 mg nightly as needed for sleep, resume " trazodone 150 mg nightly.  UDS and Controlled Substance Treatment Agreement updated today.  Patient understands this prescription is a controlled substance which is potentially habit-forming and its use is regulated by the CHAS. Refills are subject to terms of a controlled substance treatment agreement. Any refill requires a new prescription that must be obtained from this office during regular office hours. Patient advised that they must be seen in clinic every 6 months for refills. This medicine can cause nausea, sedation, confusion.   - Controlled Substance Treatment Agreement  - [facility] PAIN MANAGEMENT SCREEN; Future  - traZODone (DESYREL) 150 MG Tab; Take 1 Tablet by mouth at bedtime as needed for Sleep.  Dispense: 90 Tablet; Refill: 1  - zolpidem (AMBIEN) 10 MG Tab; Take 1 Tablet by mouth at bedtime as needed for Sleep for up to 90 days.  Dispense: 90 Tablet; Refill: 0    3. Nocturia  Discontinue prazosin, resume tamsulosin 0.8 mg nightly, refill sent to pharmacy.  Recommend following up with urology.  - tamsulosin (FLOMAX) 0.4 MG capsule; Take 2 Capsules by mouth at bedtime.  Dispense: 180 Capsule; Refill: 3    4. Severe motion sickness, initial encounter  New to examiner.  Patient reports that he gets severe motion sickness and over-the-counter medications are not helpful.  He will be traveling to the [facility] at the end of April 2022 and is requesting a medication for the trip.  - meclizine (ANTIVERT) 25 MG Tab; Take 1 Tablet by mouth 3 times a day as needed (motion sickness).  Dispense: 90 Tablet; Refill: 1     Educated in proper administration of medication(s) ordered today including safety, possible SE, risks, benefits, rationale and alternatives to therapy.   Supportive care, differential diagnoses, and indications for immediate follow-up discussed with patient.    Pathogenesis of diagnosis discussed including typical length and natural progression.    Instructed to return to clinic or nearest  emergency department for any change in condition, further concerns, or worsening of symptoms.  Patient states understanding of the plan of care and discharge instructions.     Return in about 3 months (around 6/14/2022) for ADD/ADHD, Medication Refill.    Please note that this dictation was created using voice recognition software. I have made every reasonable attempt to correct obvious errors, but I expect that there are errors of grammar and possibly content that I did not discover before finalizing the note.

## 2022-03-14 NOTE — ASSESSMENT & PLAN NOTE
Chronic, ongoing. Continues Adderall 10 mg in the morning around 7am and 10 mg around 11 AM. Patient continues to do well with this dosing and scheduling. Denies side effects of the medication.  Reports that he did not tolerate controlled release in the past.  Patient is requesting refill today.    UDS: 3/14/2022     Controlled Substance Treatment Agreement: 3/14/2022     Obtained and reviewed the patient utilization report state pharmacy database on 3/14/2022. Based on assessment of report prescription for adderall and ambien is medically necessary. Patient has not requested any early refills and exhibits no abberant behavior. I have advised patient to keep medication and safe place and to not drive, use alcohol, or take illicit drugs while taking this medication.

## 2022-03-14 NOTE — ASSESSMENT & PLAN NOTE
Chronic, ongoing and related to PTSD, with intermittent nightmares. Continues zolpidem 10 mg nightly. Tried prazosin and did not find it beneficial. Previously took trazodone. Requesting refill of zolpidem. He will be travelling to the [facility] on 4/30/22 for 4-6 weeks and is hoping to get a one time longer prescription for zolpidem. Due for updated UDS and controlled substance treatment agreement.     UDS: 3/14/2022    Controlled Substance Treatment Agreement: 3/14/2022    Obtained and reviewed the patient utilization report state pharmacy database on 3/14/2022. Based on assessment of report prescription for zolpidem is medically necessary. Patient has not requested any early refills and exhibits no abberant behavior. I have advised patient to keep medication and safe place and to not drive, use alcohol, or take illicit drugs while taking this medication.

## 2022-04-02 DIAGNOSIS — F51.04 PSYCHOPHYSIOLOGICAL INSOMNIA: ICD-10-CM

## 2022-04-02 DIAGNOSIS — F90.2 ATTENTION DEFICIT HYPERACTIVITY DISORDER (ADHD), COMBINED TYPE: ICD-10-CM

## 2022-05-17 ENCOUNTER — OFFICE VISIT (OUTPATIENT)
Dept: MEDICAL GROUP | Facility: PHYSICIAN GROUP | Age: 42
End: 2022-05-17
Payer: OTHER GOVERNMENT

## 2022-05-17 VITALS
SYSTOLIC BLOOD PRESSURE: 128 MMHG | WEIGHT: 174 LBS | DIASTOLIC BLOOD PRESSURE: 84 MMHG | HEART RATE: 82 BPM | OXYGEN SATURATION: 96 % | TEMPERATURE: 98 F | HEIGHT: 70 IN | BODY MASS INDEX: 24.91 KG/M2

## 2022-05-17 DIAGNOSIS — F41.9 ANXIETY: ICD-10-CM

## 2022-05-17 PROCEDURE — 99213 OFFICE O/P EST LOW 20 MIN: CPT | Performed by: NURSE PRACTITIONER

## 2022-05-17 RX ORDER — SCOLOPAMINE TRANSDERMAL SYSTEM 1 MG/1
PATCH, EXTENDED RELEASE TRANSDERMAL
COMMUNITY
Start: 2022-04-27 | End: 2022-07-07

## 2022-05-17 RX ORDER — PROMETHAZINE HYDROCHLORIDE 25 MG/1
SUPPOSITORY RECTAL
COMMUNITY
Start: 2022-03-17 | End: 2022-07-07

## 2022-05-17 RX ORDER — ESCITALOPRAM OXALATE 10 MG/1
10 TABLET ORAL DAILY
Qty: 90 TABLET | Refills: 0 | Status: SHIPPED | OUTPATIENT
Start: 2022-05-17 | End: 2022-07-07

## 2022-05-17 ASSESSMENT — FIBROSIS 4 INDEX: FIB4 SCORE: 0.72

## 2022-05-17 NOTE — PROGRESS NOTES
CC:   Chief Complaint   Patient presents with   • Anxiety     + depression wants to see if he can start escitalopram, has not had it before     HISTORY OF THE PRESENT ILLNESS: Patient is a 42 y.o. male. This pleasant patient is here today expressing interest in starting a new anxiety medication.    Health Maintenance: Reviewed    Anxiety  Patient continues to experience increased feelings of anxiety.  Does report that he has things to look forward to including getting  and his son moving here.  His ex-girlfriend recommended escitalopram for anxiety and he is interested in trying this.    Allergies: Patient has no known allergies.  Current Outpatient Medications Ordered in Epic   Medication Sig Dispense Refill   • promethazine (PHENERGAN) 25 MG Suppos      • scopolamine (TRANSDERM-SCOP) 1 mg/72hr PATCH 72 HR      • escitalopram (LEXAPRO) 10 MG Tab Take 1 Tablet by mouth every day. 90 Tablet 0   • tamsulosin (FLOMAX) 0.4 MG capsule Take 2 Capsules by mouth at bedtime. 180 Capsule 3   • traZODone (DESYREL) 150 MG Tab Take 1 Tablet by mouth at bedtime as needed for Sleep. 90 Tablet 1   • zolpidem (AMBIEN) 10 MG Tab Take 1 Tablet by mouth at bedtime as needed for Sleep for up to 90 days. 90 Tablet 0   • amphetamine-dextroamphetamine (ADDERALL) 10 MG Tab Take 1 Tablet by mouth 2 times a day for 30 days. 60 Tablet 0   • testosterone cypionate (DEPO-TESTOSTERONE) 200 MG/ML Solution injection INJECT 0.5 MLS INTRAMUSCULARLY WEEKLY     • omega-3 acid ethyl esters (LOVAZA) 1 GM capsule Take 1 Cap by mouth 2 Times a Day. 180 Cap 0   • vitamin D (CHOLECALCIFEROL) 1000 Unit Tab      • cyanocobalamin (VITAMIN B-12) 500 MCG Tab      • anastrozole (ARIMIDEX) 1 MG Tab Take 1 mg by mouth 3 times a week.     • naproxen (NAPROSYN) 500 MG Tab Take 500 mg by mouth 2 times a day, with meals.       No current Epic-ordered facility-administered medications on file.     Past Medical History:   Diagnosis Date   • ADHD    • ED (erectile  dysfunction)    • Hyperlipidemia    • Hypogonadism male    • Insomnia    • Lateral epicondylitis of right elbow 2018   • Left hand pain 2019   • CHARO on CPAP    • PTSD (post-traumatic stress disorder)      Past Surgical History:   Procedure Laterality Date   • CARPAL TUNNEL RELEASE Bilateral      Social History     Tobacco Use   • Smoking status: Former Smoker     Years: 2.00     Types: Cigarettes     Start date: 1997     Quit date: 1999     Years since quittin.9   • Smokeless tobacco: Never Used   • Tobacco comment: only smoked off and on for 2 years   Vaping Use   • Vaping Use: Some days   • Substances: CBD   Substance Use Topics   • Alcohol use: Not Currently   • Drug use: Not Currently     Types: Marijuana     Comment: Reports marijuana use once every few months     Social History     Social History Narrative   • Not on file     Family History   Problem Relation Age of Onset   • Hyperlipidemia Mother    • Hypertension Mother    • Stroke Maternal Grandmother    • Diabetes Maternal Grandfather    • Stroke Maternal Grandfather    • Diabetes Paternal Grandmother    • Stroke Paternal Grandmother    • No Known Problems Paternal Grandfather    • No Known Problems Son    • No Known Problems Sister    • No Known Problems Sister    • No Known Problems Sister    • No Known Problems Sister      ROS:   Constitutional: No fevers, chills, malaise/fatigue.  Eyes: No eye pain.  ENT: No sore throat, congestion.   Resp: No cough, shortness of breath.  CV: No chest pain, leg swelling, palpitations.  GI: No nausea/vomiting, abdominal pain, constipation, diarrhea.  : No dysuria, hematuria.  MSK: No weakness.  Skin: No rashes.  Neuro: No dizziness, weakness, headaches.  Psych: +anxiety/depression. No suicidal ideations.    All remaining systems reviewed and found to be negative, except as stated above.         Exam: /84 (BP Location: Left arm, Patient Position: Sitting, BP Cuff Size: Adult)   Pulse 82   " Temp 36.7 °C (98 °F) (Temporal)   Ht 1.778 m (5' 10\")   Wt 78.9 kg (174 lb)   SpO2 96%  Body mass index is 24.97 kg/m².    General: Well nourished, well developed male in NAD, awake and conversant.  Eyes: Normal conjunctiva, anicteric.  Round symmetrical pupils.  ENT: Hearing grossly intact.  No nasal discharge.  Neck: Neck is supple.  No masses or thyromegaly.  CV: No lower extremity edema.  Respiratory: Respirations are nonlabored.  No wheezing.  Abdomen: Non-Distended.  Skin: Warm.  No rashes or ulcers.  MSK: Normal ambulation.  No clubbing or cyanosis.  Neuro: Sensation and CN II-XII grossly normal.  Psych: Alert and oriented.  Cooperative, appropriate mood and affect, normal judgment.      Assessment/Plan:  1. Anxiety  Start escitalopram 10 mg once every day.  Patient may initially experience symptoms of nausea, GI upset, and dizziness that should resolve within a couple weeks.  Educated patient that if he begins to experience feelings of wanting to harm himself or suicidal ideations, that he needs to be seen at [facility], urgent care, or in the emergency room.  If patient wishes to discontinue this medication, we will need to wean off.  - escitalopram (LEXAPRO) 10 MG Tab; Take 1 Tablet by mouth every day.  Dispense: 90 Tablet; Refill: 0      Educated in proper administration of medication(s) ordered today including safety, possible SE, risks, benefits, rationale and alternatives to therapy.   Supportive care, differential diagnoses, and indications for immediate follow-up discussed with patient.    Pathogenesis of diagnosis discussed including typical length and natural progression.    Instructed to return to clinic or nearest emergency department for any change in condition, further concerns, or worsening of symptoms.  Patient states understanding of the plan of care and discharge instructions.    Return if symptoms worsen or fail to improve.    Please note that this dictation was created using voice " recognition software. I have made every reasonable attempt to correct obvious errors, but I expect that there are errors of grammar and possibly content that I did not discover before finalizing the note.

## 2022-05-17 NOTE — ASSESSMENT & PLAN NOTE
Patient continues to experience increased feelings of anxiety.  Does report that he has things to look forward to including getting  and his son moving here.  His ex-girlfriend recommended escitalopram for anxiety and he is interested in trying this.

## 2022-06-21 DIAGNOSIS — F90.2 ATTENTION DEFICIT HYPERACTIVITY DISORDER (ADHD), COMBINED TYPE: ICD-10-CM

## 2022-06-22 RX ORDER — DEXTROAMPHETAMINE SACCHARATE, AMPHETAMINE ASPARTATE, DEXTROAMPHETAMINE SULFATE AND AMPHETAMINE SULFATE 2.5; 2.5; 2.5; 2.5 MG/1; MG/1; MG/1; MG/1
10 TABLET ORAL 2 TIMES DAILY
Qty: 60 TABLET | Refills: 0 | OUTPATIENT
Start: 2022-06-22 | End: 2022-07-22

## 2022-07-07 ENCOUNTER — OFFICE VISIT (OUTPATIENT)
Dept: MEDICAL GROUP | Facility: PHYSICIAN GROUP | Age: 42
End: 2022-07-07
Payer: OTHER GOVERNMENT

## 2022-07-07 VITALS
BODY MASS INDEX: 26.96 KG/M2 | WEIGHT: 182 LBS | TEMPERATURE: 98 F | HEART RATE: 68 BPM | HEIGHT: 69 IN | SYSTOLIC BLOOD PRESSURE: 118 MMHG | DIASTOLIC BLOOD PRESSURE: 66 MMHG | OXYGEN SATURATION: 97 %

## 2022-07-07 DIAGNOSIS — F51.04 PSYCHOPHYSIOLOGICAL INSOMNIA: ICD-10-CM

## 2022-07-07 DIAGNOSIS — R35.1 NOCTURIA: ICD-10-CM

## 2022-07-07 DIAGNOSIS — F41.9 ANXIETY: ICD-10-CM

## 2022-07-07 DIAGNOSIS — F90.2 ATTENTION DEFICIT HYPERACTIVITY DISORDER (ADHD), COMBINED TYPE: ICD-10-CM

## 2022-07-07 DIAGNOSIS — G47.33 OSA ON CPAP: ICD-10-CM

## 2022-07-07 PROCEDURE — 99214 OFFICE O/P EST MOD 30 MIN: CPT | Performed by: NURSE PRACTITIONER

## 2022-07-07 RX ORDER — DEXTROAMPHETAMINE SACCHARATE, AMPHETAMINE ASPARTATE, DEXTROAMPHETAMINE SULFATE AND AMPHETAMINE SULFATE 2.5; 2.5; 2.5; 2.5 MG/1; MG/1; MG/1; MG/1
10 TABLET ORAL 2 TIMES DAILY
Qty: 60 TABLET | Refills: 0 | Status: SHIPPED | OUTPATIENT
Start: 2022-08-06 | End: 2022-09-05

## 2022-07-07 RX ORDER — DEXTROAMPHETAMINE SACCHARATE, AMPHETAMINE ASPARTATE, DEXTROAMPHETAMINE SULFATE AND AMPHETAMINE SULFATE 2.5; 2.5; 2.5; 2.5 MG/1; MG/1; MG/1; MG/1
10 TABLET ORAL 2 TIMES DAILY
Qty: 60 TABLET | Refills: 0 | Status: SHIPPED | OUTPATIENT
Start: 2022-07-07 | End: 2022-09-28 | Stop reason: SDUPTHER

## 2022-07-07 RX ORDER — DEXTROAMPHETAMINE SACCHARATE, AMPHETAMINE ASPARTATE, DEXTROAMPHETAMINE SULFATE AND AMPHETAMINE SULFATE 2.5; 2.5; 2.5; 2.5 MG/1; MG/1; MG/1; MG/1
10 TABLET ORAL 2 TIMES DAILY
Qty: 60 TABLET | Refills: 0 | Status: SHIPPED | OUTPATIENT
Start: 2022-09-05 | End: 2022-10-05

## 2022-07-07 RX ORDER — ZOLPIDEM TARTRATE 10 MG/1
10 TABLET ORAL NIGHTLY PRN
Qty: 30 TABLET | Refills: 5 | Status: SHIPPED | OUTPATIENT
Start: 2022-07-07 | End: 2022-09-28 | Stop reason: SDUPTHER

## 2022-07-07 ASSESSMENT — FIBROSIS 4 INDEX: FIB4 SCORE: 0.72

## 2022-07-07 NOTE — PROGRESS NOTES
CC:   Chief Complaint   Patient presents with   • Medication Refill     Adderall, Ambien     HISTORY OF THE PRESENT ILLNESS: Patient is a 42 y.o. male. This pleasant patient is here today for medication refill.    Health Maintenance: Reviewed    Anxiety  Reports that he took lexapro for 4 days, felt very dizzy and decided to stop taking the medication. Not interested in trying any other medications.    Insomnia  Continues zolpidem 10 mg nightly and trazodone 150 mg nightly. Wants to wean off of trazodone due to side effects of runny nose. Insomnia is related to PTSD with intermittent nightmares.  He has also been diagnosed with obstructive sleep apnea and tried CPAP, states that he was unable to sleep with the mask and could not afford the cleaning supplies.  Not currently following a sleep medicine.    UDS: 3/14/2022    Controlled Substance Treatment Agreement: 3/14/2022    Obtained and reviewed the patient utilization report state pharmacy database on 3/14/2022. Based on assessment of report prescription for zolpidem is medically necessary. Patient has not requested any early refills and exhibits no abberant behavior. I have advised patient to keep medication and safe place and to not drive, use alcohol, or take illicit drugs while taking this medication.    Attention deficit hyperactivity disorder (ADHD), combined type  Continues adderall 10 mg twice daily around 7am and 11 am. Has been out of the medication for a couple of weeks. Denies side effects of the medication.     UDS: 3/14/2022    Controlled Substance Treatment Agreement: 3/14/2022    Obtained and reviewed the patient utilization report state pharmacy database on 7/7/2022. Based on assessment of report prescription for Adderall is medically necessary. Patient has not requested any early refills and exhibits no abberant behavior. I have advised patient to keep medication and safe place and to not drive, use alcohol, or take illicit drugs while taking  "this medication.    Patient understands this prescription is a controlled substance which is potentially habit-forming and its use is regulated by the [employer]. We also discussed the new \"black box\" warning regarding the lethal combination of opioids and benzodiazepines. Refills are subject to terms of a controlled substance agreement and patient has an updated one on file. Any refill requires an office visit. This medicine can cause nausea, significant constipation, sedation, confusion and accidental overdose.     CHARO on CPAP  Patient had a sleep study a few years ago and was diagnosed with severe obstructive sleep apnea.  Does not use CPAP machine due to cost of cleaning supplies. Also felt that it was more difficult to sleep with the mask on. Continues to have significant issues with insomnia and daytime fatigue. Not currently following with sleep medicine.    Nocturia  Chronic, ongoing. Does not limit water before bed. Previously took tamsulosin, felt that the medication only made his urine stream stronger but did not decrease nocturia frequency.  Prazosin made symptoms worse.  Does not follow with urology.       Allergies: Patient has no known allergies.  Current Outpatient Medications Ordered in Epic   Medication Sig Dispense Refill   • zolpidem (AMBIEN) 10 MG Tab Take 1 Tablet by mouth at bedtime as needed for Sleep for up to 30 days. 30 Tablet 5   • amphetamine-dextroamphetamine (ADDERALL) 10 MG Tab Take 1 Tablet by mouth 2 times a day for 30 days. 60 Tablet 0   • [START ON 8/6/2022] amphetamine-dextroamphetamine (ADDERALL) 10 MG Tab Take 1 Tablet by mouth 2 times a day for 30 days. 60 Tablet 0   • [START ON 9/5/2022] amphetamine-dextroamphetamine (ADDERALL) 10 MG Tab Take 1 Tablet by mouth 2 times a day for 30 days. 60 Tablet 0   • traZODone (DESYREL) 150 MG Tab Take 1 Tablet by mouth at bedtime as needed for Sleep. 90 Tablet 1   • anastrozole (ARIMIDEX) 1 MG Tab Take 1 mg by mouth 3 times a week.     • naproxen " "(NAPROSYN) 500 MG Tab Take 500 mg by mouth 2 times a day, with meals.       No current Epic-ordered facility-administered medications on file.     Past Medical History:   Diagnosis Date   • ADHD    • ED (erectile dysfunction)    • Hyperlipidemia    • Hypogonadism male    • Insomnia    • Lateral epicondylitis of right elbow 2018   • Left hand pain 2019   • CHARO on CPAP    • PTSD (post-traumatic stress disorder)      Past Surgical History:   Procedure Laterality Date   • CARPAL TUNNEL RELEASE Bilateral      Social History     Tobacco Use   • Smoking status: Former Smoker     Years: 2.00     Types: Cigarettes     Start date: 1997     Quit date: 1999     Years since quittin.0   • Smokeless tobacco: Never Used   • Tobacco comment: only smoked off and on for 2 years   Vaping Use   • Vaping Use: Some days   • Substances: CBD   Substance Use Topics   • Alcohol use: Not Currently   • Drug use: Not Currently     Types: Marijuana     Comment: Reports marijuana use once every few months     Social History     Social History Narrative   • Not on file     Family History   Problem Relation Age of Onset   • Hyperlipidemia Mother    • Hypertension Mother    • Stroke Maternal Grandmother    • Diabetes Maternal Grandfather    • Stroke Maternal Grandfather    • Diabetes Paternal Grandmother    • Stroke Paternal Grandmother    • No Known Problems Paternal Grandfather    • No Known Problems Son    • No Known Problems Sister    • No Known Problems Sister    • No Known Problems Sister    • No Known Problems Sister      ROS:   See HPI        Exam: /66 (BP Location: Left arm, Patient Position: Sitting, BP Cuff Size: Adult)   Pulse 68   Temp 36.7 °C (98 °F) (Temporal)   Ht 1.753 m (5' 9\")   Wt 82.6 kg (182 lb)   SpO2 97%  Body mass index is 26.88 kg/m².    General: Well nourished, well developed male in NAD, awake and conversant.  Eyes: Normal conjunctiva, anicteric.  Round symmetrical pupils.  ENT: Hearing " grossly intact.  No nasal discharge.  Neck: Neck is supple.  No masses or thyromegaly.  CV: No lower extremity edema.  Respiratory: Respirations are nonlabored.  No wheezing.  Abdomen: Non-Distended.  Skin: Warm.  No rashes or ulcers.  MSK: Normal ambulation.  No clubbing or cyanosis.  Neuro: Sensation and CN II-XII grossly normal.  Psych: Alert and oriented.  Cooperative, appropriate mood and affect, normal judgment.      Assessment/Plan:  1. Attention deficit hyperactivity disorder (ADHD), combined type  Continue Adderall 10 mg twice daily, refill sent to pharmacy.  UDS and Controlled Substance Treatment Agreement up to date.  Patient understands this prescription is a controlled substance which is potentially habit-forming and its use is regulated by the [employer]. Refills are subject to terms of a controlled substance treatment agreement. Any refill requires a new prescription that must be obtained from this office during regular office hours. Patient advised that they must be seen in clinic every 90 days for refills.  - amphetamine-dextroamphetamine (ADDERALL) 10 MG Tab; Take 1 Tablet by mouth 2 times a day for 30 days.  Dispense: 60 Tablet; Refill: 0  - amphetamine-dextroamphetamine (ADDERALL) 10 MG Tab; Take 1 Tablet by mouth 2 times a day for 30 days.  Dispense: 60 Tablet; Refill: 0  - amphetamine-dextroamphetamine (ADDERALL) 10 MG Tab; Take 1 Tablet by mouth 2 times a day for 30 days.  Dispense: 60 Tablet; Refill: 0    2. Psychophysiological insomnia  3. CHARO on CPAP  Continue zolpidem 10 mg nightly as needed for insomnia and trazodone 150 mg nightly. Referral to sleep medicine for re-evaluation.  - zolpidem (AMBIEN) 10 MG Tab; Take 1 Tablet by mouth at bedtime as needed for Sleep for up to 30 days.  Dispense: 30 Tablet; Refill: 5  - Referral to Pulmonary and Sleep Medicine    4. Nocturia  No longer taking medication for this issue, declines restarting and referral to urology. Encourage patient to limit fluids  before bedtime.    5. Anxiety  Patient decided to stop escitalopram after 4 days due to dizziness, declines trying another medication for this issue. Encourage patient to notify provider if he would like to try another medication or have a referral to behavioral health.     Educated in proper administration of medication(s) ordered today including safety, possible SE, risks, benefits, rationale and alternatives to therapy.   Supportive care, differential diagnoses, and indications for immediate follow-up discussed with patient.    Pathogenesis of diagnosis discussed including typical length and natural progression.    Instructed to return to clinic or nearest emergency department for any change in condition, further concerns, or worsening of symptoms.  Patient states understanding of the plan of care and discharge instructions.    Return in about 12 weeks (around 9/29/2022) for ADD/ADHD, Medication Refill.    Please note that this dictation was created using voice recognition software. I have made every reasonable attempt to correct obvious errors, but I expect that there are errors of grammar possibly content that I did not discover before finalizing the note.

## 2022-07-07 NOTE — ASSESSMENT & PLAN NOTE
Chronic, ongoing. Does not limit water before bed. Previously took tamsulosin, felt that the medication only made his urine stream stronger but did not decrease nocturia frequency.  Prazosin made symptoms worse.  Does not follow with urology.

## 2022-07-07 NOTE — ASSESSMENT & PLAN NOTE
Patient had a sleep study a few years ago and was diagnosed with severe obstructive sleep apnea.  Does not use CPAP machine due to cost of cleaning supplies. Also felt that it was more difficult to sleep with the mask on. Continues to have significant issues with insomnia and daytime fatigue. Not currently following with sleep medicine.

## 2022-07-07 NOTE — ASSESSMENT & PLAN NOTE
Reports that he took lexapro for 4 days, felt very dizzy and decided to stop taking the medication. Not interested in trying any other medications.

## 2022-07-07 NOTE — ASSESSMENT & PLAN NOTE
"Continues adderall 10 mg twice daily around 7am and 11 am. Has been out of the medication for a couple of weeks. Denies side effects of the medication.     UDS: 3/14/2022    Controlled Substance Treatment Agreement: 3/14/2022    Obtained and reviewed the patient utilization report state pharmacy database on 7/7/2022. Based on assessment of report prescription for Adderall is medically necessary. Patient has not requested any early refills and exhibits no abberant behavior. I have advised patient to keep medication and safe place and to not drive, use alcohol, or take illicit drugs while taking this medication.    Patient understands this prescription is a controlled substance which is potentially habit-forming and its use is regulated by the [employer]. We also discussed the new \"black box\" warning regarding the lethal combination of opioids and benzodiazepines. Refills are subject to terms of a controlled substance agreement and patient has an updated one on file. Any refill requires an office visit. This medicine can cause nausea, significant constipation, sedation, confusion and accidental overdose.   "

## 2022-07-07 NOTE — ASSESSMENT & PLAN NOTE
Continues zolpidem 10 mg nightly and trazodone 150 mg nightly. Wants to wean off of trazodone due to side effects of runny nose. Insomnia is related to PTSD with intermittent nightmares.  He has also been diagnosed with obstructive sleep apnea and tried CPAP, states that he was unable to sleep with the mask and could not afford the cleaning supplies.  Not currently following a sleep medicine.    UDS: 3/14/2022    Controlled Substance Treatment Agreement: 3/14/2022    Obtained and reviewed the patient utilization report state pharmacy database on 3/14/2022. Based on assessment of report prescription for zolpidem is medically necessary. Patient has not requested any early refills and exhibits no abberant behavior. I have advised patient to keep medication and safe place and to not drive, use alcohol, or take illicit drugs while taking this medication.

## 2022-09-27 ENCOUNTER — APPOINTMENT (OUTPATIENT)
Dept: MEDICAL GROUP | Facility: PHYSICIAN GROUP | Age: 42
End: 2022-09-27
Payer: OTHER GOVERNMENT

## 2022-09-28 ENCOUNTER — OFFICE VISIT (OUTPATIENT)
Dept: MEDICAL GROUP | Facility: PHYSICIAN GROUP | Age: 42
End: 2022-09-28
Payer: OTHER GOVERNMENT

## 2022-09-28 VITALS
SYSTOLIC BLOOD PRESSURE: 112 MMHG | TEMPERATURE: 97.9 F | DIASTOLIC BLOOD PRESSURE: 64 MMHG | WEIGHT: 187 LBS | BODY MASS INDEX: 27.7 KG/M2 | HEART RATE: 107 BPM | OXYGEN SATURATION: 95 % | HEIGHT: 69 IN

## 2022-09-28 DIAGNOSIS — F51.04 PSYCHOPHYSIOLOGICAL INSOMNIA: ICD-10-CM

## 2022-09-28 DIAGNOSIS — T75.3XXD SEVERE MOTION SICKNESS, SUBSEQUENT ENCOUNTER: ICD-10-CM

## 2022-09-28 DIAGNOSIS — F90.2 ATTENTION DEFICIT HYPERACTIVITY DISORDER (ADHD), COMBINED TYPE: ICD-10-CM

## 2022-09-28 PROCEDURE — 99214 OFFICE O/P EST MOD 30 MIN: CPT | Performed by: NURSE PRACTITIONER

## 2022-09-28 RX ORDER — ZOLPIDEM TARTRATE 10 MG/1
10 TABLET ORAL
COMMUNITY
Start: 2022-09-06 | End: 2022-09-28

## 2022-09-28 RX ORDER — DEXTROAMPHETAMINE SACCHARATE, AMPHETAMINE ASPARTATE, DEXTROAMPHETAMINE SULFATE AND AMPHETAMINE SULFATE 2.5; 2.5; 2.5; 2.5 MG/1; MG/1; MG/1; MG/1
10 TABLET ORAL 2 TIMES DAILY
Qty: 60 TABLET | Refills: 0 | Status: SHIPPED | OUTPATIENT
Start: 2022-11-05 | End: 2022-12-05

## 2022-09-28 RX ORDER — SCOLOPAMINE TRANSDERMAL SYSTEM 1 MG/1
1 PATCH, EXTENDED RELEASE TRANSDERMAL
Qty: 8 PATCH | Refills: 0 | Status: SHIPPED | OUTPATIENT
Start: 2022-09-28

## 2022-09-28 RX ORDER — CHORIONIC GONADOTROPIN 10000 UNIT
KIT INTRAMUSCULAR
COMMUNITY
Start: 2022-09-24

## 2022-09-28 RX ORDER — PROMETHAZINE HYDROCHLORIDE 25 MG/1
25 SUPPOSITORY RECTAL
Qty: 20 SUPPOSITORY | Refills: 0 | Status: SHIPPED | OUTPATIENT
Start: 2022-09-28

## 2022-09-28 RX ORDER — SCOLOPAMINE TRANSDERMAL SYSTEM 1 MG/1
PATCH, EXTENDED RELEASE TRANSDERMAL
COMMUNITY
Start: 2022-08-19 | End: 2022-09-28

## 2022-09-28 RX ORDER — DEXTROAMPHETAMINE SACCHARATE, AMPHETAMINE ASPARTATE, DEXTROAMPHETAMINE SULFATE AND AMPHETAMINE SULFATE 2.5; 2.5; 2.5; 2.5 MG/1; MG/1; MG/1; MG/1
10 TABLET ORAL 2 TIMES DAILY
Qty: 60 TABLET | Refills: 0 | Status: SHIPPED | OUTPATIENT
Start: 2022-12-05 | End: 2023-01-04

## 2022-09-28 RX ORDER — FAMOTIDINE 20 MG/1
TABLET, FILM COATED ORAL
COMMUNITY
Start: 2022-08-29

## 2022-09-28 RX ORDER — TESTOSTERONE CYPIONATE 200 MG/ML
INJECTION, SOLUTION INTRAMUSCULAR
COMMUNITY
Start: 2022-07-11

## 2022-09-28 RX ORDER — SODIUM FLUORIDE 6 MG/ML
PASTE, DENTIFRICE DENTAL
COMMUNITY
Start: 2022-08-24

## 2022-09-28 RX ORDER — PROMETHAZINE HYDROCHLORIDE 25 MG/1
SUPPOSITORY RECTAL
COMMUNITY
Start: 2022-08-19 | End: 2022-09-28

## 2022-09-28 RX ORDER — ZOLPIDEM TARTRATE 10 MG/1
10 TABLET ORAL NIGHTLY PRN
Qty: 60 TABLET | Refills: 0 | Status: SHIPPED | OUTPATIENT
Start: 2022-10-06 | End: 2022-12-05

## 2022-09-28 RX ORDER — TRIAMCINOLONE ACETONIDE 0.1 %
PASTE (GRAM) DENTAL
COMMUNITY
Start: 2022-08-25

## 2022-09-28 RX ORDER — DEXTROAMPHETAMINE SACCHARATE, AMPHETAMINE ASPARTATE, DEXTROAMPHETAMINE SULFATE AND AMPHETAMINE SULFATE 2.5; 2.5; 2.5; 2.5 MG/1; MG/1; MG/1; MG/1
10 TABLET ORAL 2 TIMES DAILY
Qty: 60 TABLET | Refills: 0 | Status: SHIPPED | OUTPATIENT
Start: 2022-10-06 | End: 2022-11-05

## 2022-09-28 RX ORDER — SILDENAFIL 100 MG/1
TABLET, FILM COATED ORAL
COMMUNITY
Start: 2022-07-27

## 2022-09-28 ASSESSMENT — FIBROSIS 4 INDEX: FIB4 SCORE: 0.72

## 2022-09-30 ENCOUNTER — TELEPHONE (OUTPATIENT)
Dept: MEDICAL GROUP | Facility: PHYSICIAN GROUP | Age: 42
End: 2022-09-30
Payer: OTHER GOVERNMENT

## 2022-09-30 NOTE — ASSESSMENT & PLAN NOTE
"Continues adderall 10 mg twice daily around 7am and 11 am. Has been out of the medication for a couple of weeks. Denies side effects of the medication.     UDS: 3/14/2022    Controlled Substance Treatment Agreement: 3/14/2022    Obtained and reviewed the patient utilization report state pharmacy database on 9/28/22. Based on assessment of report prescription for Adderall is medically necessary. Patient has not requested any early refills and exhibits no abberant behavior. I have advised patient to keep medication and safe place and to not drive, use alcohol, or take illicit drugs while taking this medication.    Patient understands this prescription is a controlled substance which is potentially habit-forming and its use is regulated by the [employer]. We also discussed the new \"black box\" warning regarding the lethal combination of opioids and benzodiazepines. Refills are subject to terms of a controlled substance agreement and patient has an updated one on file. Any refill requires an office visit. This medicine can cause nausea, significant constipation, sedation, confusion and accidental overdose.   "

## 2022-09-30 NOTE — PROGRESS NOTES
"CC:   Chief Complaint   Patient presents with    Medication Refill     ADHD    Paperwork     Donating blood     HISTORY OF THE PRESENT ILLNESS: Patient is a 42 y.o. male. This pleasant patient is here today for medication refill.    Health Maintenance: Reviewed    Attention deficit hyperactivity disorder (ADHD), combined type  Continues adderall 10 mg twice daily around 7am and 11 am. Has been out of the medication for a couple of weeks. Denies side effects of the medication.     UDS: 3/14/2022    Controlled Substance Treatment Agreement: 3/14/2022    Obtained and reviewed the patient utilization report state pharmacy database on 9/28/22. Based on assessment of report prescription for Adderall is medically necessary. Patient has not requested any early refills and exhibits no abberant behavior. I have advised patient to keep medication and safe place and to not drive, use alcohol, or take illicit drugs while taking this medication.    Patient understands this prescription is a controlled substance which is potentially habit-forming and its use is regulated by the [facility]. We also discussed the new \"black box\" warning regarding the lethal combination of opioids and benzodiazepines. Refills are subject to terms of a controlled substance agreement and patient has an updated one on file. Any refill requires an office visit. This medicine can cause nausea, significant constipation, sedation, confusion and accidental overdose.     Insomnia  Continues zolpidem 10 mg nightly and trazodone 150 mg nightly. Wants to wean off of trazodone due to side effects of runny nose. Insomnia is related to PTSD with intermittent nightmares.  He has also been diagnosed with obstructive sleep apnea and tried CPAP, states that he was unable to sleep with the mask and could not afford the cleaning supplies.  Not currently following a sleep medicine.    UDS: 3/14/2022    Controlled Substance Treatment Agreement: 3/14/2022    Obtained and reviewed " the patient utilization report state pharmacy database on 9/28/22. Based on assessment of report prescription for zolpidem is medically necessary. Patient has not requested any early refills and exhibits no abberant behavior. I have advised patient to keep medication and safe place and to not drive, use alcohol, or take illicit drugs while taking this medication.       Allergies: Patient has no known allergies.  Current Outpatient Medications Ordered in Epic   Medication Sig Dispense Refill    triamcinolone acetonide (ORALONE) 0.1 % Paste       testosterone cypionate (DEPO-TESTOSTERONE) 200 MG/ML Solution injection INJECT 0.4ML INTRAMUSCULARLY EVERY WEEK.      PREVIDENT 5000 BOOSTER PLUS 1.1 % Paste       sildenafil citrate (VIAGRA) 100 MG tablet       famotidine (PEPCID) 20 MG Tab       Chorionic Gonadotropin 07522 UNIT Recon Soln       [START ON 10/6/2022] zolpidem (AMBIEN) 10 MG Tab Take 1 Tablet by mouth at bedtime as needed for Sleep for up to 60 days. 60 Tablet 0    [START ON 10/6/2022] amphetamine-dextroamphetamine (ADDERALL) 10 MG Tab Take 1 Tablet by mouth 2 times a day for 30 days. 60 Tablet 0    promethazine (PHENERGAN) 25 MG Suppos Insert 1 Suppository into the rectum 1 time a day as needed for Nausea/Vomiting (motion sickness). 20 Suppository 0    scopolamine (TRANSDERM-SCOP) 1 mg/72hr PATCH 72 HR Place 1 Patch on the skin every 72 hours. 8 Patch 0    [START ON 11/5/2022] amphetamine-dextroamphetamine (ADDERALL) 10 MG Tab Take 1 Tablet by mouth 2 times a day for 30 days. 60 Tablet 0    [START ON 12/5/2022] amphetamine-dextroamphetamine (ADDERALL) 10 MG Tab Take 1 Tablet by mouth 2 times a day for 30 days. 60 Tablet 0    amphetamine-dextroamphetamine (ADDERALL) 10 MG Tab Take 1 Tablet by mouth 2 times a day for 30 days. 60 Tablet 0    traZODone (DESYREL) 150 MG Tab Take 1 Tablet by mouth at bedtime as needed for Sleep. 90 Tablet 1    anastrozole (ARIMIDEX) 1 MG Tab Take 1 mg by mouth 3 times a week.       "naproxen (NAPROSYN) 500 MG Tab Take 500 mg by mouth 2 times a day, with meals.       No current Epic-ordered facility-administered medications on file.     Past Medical History:   Diagnosis Date    ADHD     ED (erectile dysfunction)     Hyperlipidemia     Hypogonadism male     Insomnia     Lateral epicondylitis of right elbow 2018    Left hand pain 2019    CHARO on CPAP     PTSD (post-traumatic stress disorder)      Past Surgical History:   Procedure Laterality Date    CARPAL TUNNEL RELEASE Bilateral      Social History     Tobacco Use    Smoking status: Former     Years: 2.00     Types: Cigarettes     Start date: 1997     Quit date: 1999     Years since quittin.2    Smokeless tobacco: Never    Tobacco comments:     only smoked off and on for 2 years   Vaping Use    Vaping Use: Some days    Substances: CBD   Substance Use Topics    Alcohol use: Not Currently    Drug use: Not Currently     Types: Marijuana     Comment: Reports marijuana use once every few months     Social History     Social History Narrative    Not on file     Family History   Problem Relation Age of Onset    Hyperlipidemia Mother     Hypertension Mother     Stroke Maternal Grandmother     Diabetes Maternal Grandfather     Stroke Maternal Grandfather     Diabetes Paternal Grandmother     Stroke Paternal Grandmother     No Known Problems Paternal Grandfather     No Known Problems Son     No Known Problems Sister     No Known Problems Sister     No Known Problems Sister     No Known Problems Sister      ROS:   See HPI        Exam: /64 (BP Location: Left arm, Patient Position: Sitting, BP Cuff Size: Adult)   Pulse (!) 107   Temp 36.6 °C (97.9 °F) (Temporal)   Ht 1.753 m (5' 9\")   Wt 84.8 kg (187 lb)   SpO2 95%  Body mass index is 27.62 kg/m².    General: Well nourished, well developed male in NAD, awake and conversant.  Eyes: Normal conjunctiva, anicteric.  Round symmetrical pupils.  ENT: Hearing grossly intact.  No " nasal discharge.  Neck: Neck is supple.  No masses or thyromegaly.  CV: No lower extremity edema.  Respiratory: Respirations are nonlabored.  No wheezing.  Abdomen: Non-Distended.  Skin: Warm.  No rashes or ulcers.  MSK: Normal ambulation.  No clubbing or cyanosis.  Neuro: Sensation and CN II-XII grossly normal.  Psych: Alert and oriented.  Cooperative, appropriate mood and affect, normal judgment.      Assessment/Plan:  1. Psychophysiological insomnia  Chronic, ongoing continue zolpidem 10 mg nightly as needed for insomnia and trazodone 150 mg nightly.  Urine drug screen and controlled substance treatment agreement up-to-date.  - zolpidem (AMBIEN) 10 MG Tab; Take 1 Tablet by mouth at bedtime as needed for Sleep for up to 60 days.  Dispense: 60 Tablet; Refill: 0    2. Attention deficit hyperactivity disorder (ADHD), combined type  Continue Adderall 10 mg twice daily, refill sent to pharmacy.  UDS and Controlled Substance Treatment Agreement up to date.  Patient understands this prescription is a controlled substance which is potentially habit-forming and its use is regulated by the [facility]. Refills are subject to terms of a controlled substance treatment agreement. Any refill requires a new prescription that must be obtained from this office during regular office hours. Patient advised that they must be seen in clinic every 90 days for refills.  - amphetamine-dextroamphetamine (ADDERALL) 10 MG Tab; Take 1 Tablet by mouth 2 times a day for 30 days.  Dispense: 60 Tablet; Refill: 0  - amphetamine-dextroamphetamine (ADDERALL) 10 MG Tab; Take 1 Tablet by mouth 2 times a day for 30 days.  Dispense: 60 Tablet; Refill: 0  - amphetamine-dextroamphetamine (ADDERALL) 10 MG Tab; Take 1 Tablet by mouth 2 times a day for 30 days.  Dispense: 60 Tablet; Refill: 0    3. Severe motion sickness, subsequent encounter  Chronic, ongoing.  Patient will be traveling to the [facility], scopolamine patch to be replaced every 72 hours and  promethazine suppositories once daily as needed for nausea and vomiting related to motion sickness.  - promethazine (PHENERGAN) 25 MG Suppos; Insert 1 Suppository into the rectum 1 time a day as needed for Nausea/Vomiting (motion sickness).  Dispense: 20 Suppository; Refill: 0  - scopolamine (TRANSDERM-SCOP) 1 mg/72hr PATCH 72 HR; Place 1 Patch on the skin every 72 hours.  Dispense: 8 Patch; Refill: 0     Educated in proper administration of medication(s) ordered today including safety, possible SE, risks, benefits, rationale and alternatives to therapy.   Supportive care, differential diagnoses, and indications for immediate follow-up discussed with patient.    Pathogenesis of diagnosis discussed including typical length and natural progression.    Instructed to return to clinic or nearest emergency department for any change in condition, further concerns, or worsening of symptoms.  Patient states understanding of the plan of care and discharge instructions.    Return in about 3 months (around 12/28/2022) for Medication Refill.    Please note that this dictation was created using voice recognition software. I have made every reasonable attempt to correct obvious errors, but I expect that there are errors of grammar possibly content that I did not discover before finalizing the note.

## 2022-09-30 NOTE — ASSESSMENT & PLAN NOTE
Continues zolpidem 10 mg nightly and trazodone 150 mg nightly. Wants to wean off of trazodone due to side effects of runny nose. Insomnia is related to PTSD with intermittent nightmares.  He has also been diagnosed with obstructive sleep apnea and tried CPAP, states that he was unable to sleep with the mask and could not afford the cleaning supplies.  Not currently following a sleep medicine.    UDS: 3/14/2022    Controlled Substance Treatment Agreement: 3/14/2022    Obtained and reviewed the patient utilization report state pharmacy database on 9/28/22. Based on assessment of report prescription for zolpidem is medically necessary. Patient has not requested any early refills and exhibits no abberant behavior. I have advised patient to keep medication and safe place and to not drive, use alcohol, or take illicit drugs while taking this medication.